# Patient Record
Sex: FEMALE | Race: WHITE | Employment: FULL TIME | URBAN - METROPOLITAN AREA
[De-identification: names, ages, dates, MRNs, and addresses within clinical notes are randomized per-mention and may not be internally consistent; named-entity substitution may affect disease eponyms.]

---

## 2017-01-09 ENCOUNTER — HOSPITAL ENCOUNTER (OUTPATIENT)
Dept: OTHER | Age: 52
Discharge: HOME | End: 2017-01-09
Attending: INTERNAL MEDICINE | Admitting: INTERNAL MEDICINE

## 2017-01-11 LAB — H PYLORI ANTIGEN STOOL: POSITIVE

## 2017-04-27 ENCOUNTER — OFFICE VISIT (OUTPATIENT)
Dept: ORTHOPEDIC SURGERY | Age: 52
End: 2017-04-27

## 2017-04-27 VITALS
DIASTOLIC BLOOD PRESSURE: 84 MMHG | BODY MASS INDEX: 35.36 KG/M2 | HEIGHT: 66 IN | HEART RATE: 71 BPM | SYSTOLIC BLOOD PRESSURE: 135 MMHG | WEIGHT: 220.02 LBS

## 2017-04-27 DIAGNOSIS — M79.672 FOOT PAIN, LEFT: Primary | ICD-10-CM

## 2017-04-27 DIAGNOSIS — S93.602A FOOT SPRAIN, LEFT, INITIAL ENCOUNTER: ICD-10-CM

## 2017-04-27 PROCEDURE — 99213 OFFICE O/P EST LOW 20 MIN: CPT | Performed by: PHYSICIAN ASSISTANT

## 2017-04-27 PROCEDURE — L3260 AMBULATORY SURGICAL BOOT EAC: HCPCS | Performed by: PHYSICIAN ASSISTANT

## 2017-04-27 PROCEDURE — 73630 X-RAY EXAM OF FOOT: CPT | Performed by: PHYSICIAN ASSISTANT

## 2017-05-17 ENCOUNTER — OFFICE VISIT (OUTPATIENT)
Dept: ORTHOPEDIC SURGERY | Age: 52
End: 2017-05-17

## 2017-05-17 VITALS — HEIGHT: 66 IN | BODY MASS INDEX: 35.36 KG/M2 | WEIGHT: 220.02 LBS

## 2017-05-17 DIAGNOSIS — M79.672 LEFT FOOT PAIN: ICD-10-CM

## 2017-05-17 DIAGNOSIS — S93.602A FOOT SPRAIN, LEFT, INITIAL ENCOUNTER: Primary | ICD-10-CM

## 2017-05-17 DIAGNOSIS — M65.9 SYNOVITIS OF LEFT FOOT: ICD-10-CM

## 2017-05-17 PROBLEM — S93.609A FOOT SPRAIN: Status: ACTIVE | Noted: 2017-05-17

## 2017-05-17 PROCEDURE — 99214 OFFICE O/P EST MOD 30 MIN: CPT | Performed by: FAMILY MEDICINE

## 2017-05-17 RX ORDER — NAPROXEN SODIUM 550 MG/1
550 TABLET ORAL 2 TIMES DAILY WITH MEALS
Qty: 60 TABLET | Refills: 3 | Status: SHIPPED | OUTPATIENT
Start: 2017-05-17 | End: 2018-02-07

## 2017-05-17 RX ORDER — DEXAMETHASONE SODIUM PHOSPHATE 4 MG/ML
INJECTION, SOLUTION INTRA-ARTICULAR; INTRALESIONAL; INTRAMUSCULAR; INTRAVENOUS; SOFT TISSUE
Qty: 30 ML | Refills: 0 | Status: SHIPPED | OUTPATIENT
Start: 2017-05-17 | End: 2017-07-05

## 2017-05-17 RX ORDER — METHYLPREDNISOLONE 4 MG/1
TABLET ORAL
Qty: 21 KIT | Refills: 0 | Status: SHIPPED | OUTPATIENT
Start: 2017-05-17 | End: 2017-07-05

## 2017-05-23 ENCOUNTER — HOSPITAL ENCOUNTER (OUTPATIENT)
Dept: PHYSICAL THERAPY | Age: 52
Discharge: HOME | End: 2017-05-31
Admitting: FAMILY MEDICINE

## 2017-05-25 ENCOUNTER — HOSPITAL ENCOUNTER (OUTPATIENT)
Dept: PHYSICAL THERAPY | Age: 52
Discharge: HOME | End: 2017-05-25
Admitting: FAMILY MEDICINE

## 2017-05-30 ENCOUNTER — TELEPHONE (OUTPATIENT)
Dept: ORTHOPEDIC SURGERY | Age: 52
End: 2017-05-30

## 2017-06-01 ENCOUNTER — HOSPITAL ENCOUNTER (OUTPATIENT)
Dept: PHYSICAL THERAPY | Age: 52
Discharge: HOME | End: 2017-06-01
Admitting: FAMILY MEDICINE

## 2017-06-06 ENCOUNTER — HOSPITAL ENCOUNTER (OUTPATIENT)
Dept: PHYSICAL THERAPY | Age: 52
Discharge: HOME | End: 2017-06-06
Admitting: FAMILY MEDICINE

## 2017-06-08 ENCOUNTER — HOSPITAL ENCOUNTER (OUTPATIENT)
Dept: PHYSICAL THERAPY | Age: 52
Discharge: HOME | End: 2017-06-08
Admitting: FAMILY MEDICINE

## 2017-06-12 ENCOUNTER — HOSPITAL ENCOUNTER (OUTPATIENT)
Dept: PHYSICAL THERAPY | Age: 52
Discharge: HOME | End: 2017-06-12
Admitting: FAMILY MEDICINE

## 2017-06-14 ENCOUNTER — OFFICE VISIT (OUTPATIENT)
Dept: ORTHOPEDIC SURGERY | Age: 52
End: 2017-06-14

## 2017-06-14 VITALS — BODY MASS INDEX: 35.36 KG/M2 | WEIGHT: 220.02 LBS | HEIGHT: 66 IN

## 2017-06-14 DIAGNOSIS — M79.672 LEFT FOOT PAIN: ICD-10-CM

## 2017-06-14 DIAGNOSIS — S93.602D FOOT SPRAIN, LEFT, SUBSEQUENT ENCOUNTER: Primary | ICD-10-CM

## 2017-06-14 DIAGNOSIS — M65.9 SYNOVITIS OF LEFT FOOT: ICD-10-CM

## 2017-06-14 PROCEDURE — 99213 OFFICE O/P EST LOW 20 MIN: CPT | Performed by: FAMILY MEDICINE

## 2017-06-14 RX ORDER — AMOXICILLIN AND CLAVULANATE POTASSIUM 875; 125 MG/1; MG/1
TABLET, FILM COATED ORAL
COMMUNITY
Start: 2017-06-14 | End: 2017-07-21 | Stop reason: ALTCHOICE

## 2017-06-15 ENCOUNTER — HOSPITAL ENCOUNTER (OUTPATIENT)
Dept: PHYSICAL THERAPY | Age: 52
Discharge: HOME | End: 2017-06-15
Admitting: FAMILY MEDICINE

## 2017-06-19 ENCOUNTER — HOSPITAL ENCOUNTER (OUTPATIENT)
Dept: PHYSICAL THERAPY | Age: 52
Discharge: HOME | End: 2017-06-19
Admitting: FAMILY MEDICINE

## 2017-06-22 ENCOUNTER — HOSPITAL ENCOUNTER (OUTPATIENT)
Dept: PHYSICAL THERAPY | Age: 52
Discharge: HOME | End: 2017-06-22
Admitting: FAMILY MEDICINE

## 2017-06-26 ENCOUNTER — HOSPITAL ENCOUNTER (OUTPATIENT)
Dept: PHYSICAL THERAPY | Age: 52
Discharge: HOME | End: 2017-06-26
Admitting: FAMILY MEDICINE

## 2017-06-30 ENCOUNTER — ORTHOTIC/BRACE ENCOUNTER (OUTPATIENT)
Dept: ORTHOPEDIC SURGERY | Age: 52
End: 2017-06-30

## 2017-07-05 ENCOUNTER — OFFICE VISIT (OUTPATIENT)
Dept: ORTHOPEDIC SURGERY | Age: 52
End: 2017-07-05

## 2017-07-05 VITALS — WEIGHT: 220.02 LBS | BODY MASS INDEX: 35.36 KG/M2 | HEIGHT: 66 IN

## 2017-07-05 DIAGNOSIS — M65.9 SYNOVITIS OF LEFT FOOT: Primary | ICD-10-CM

## 2017-07-05 DIAGNOSIS — S93.602D FOOT SPRAIN, LEFT, SUBSEQUENT ENCOUNTER: ICD-10-CM

## 2017-07-05 DIAGNOSIS — M79.671 RIGHT FOOT PAIN: ICD-10-CM

## 2017-07-05 PROCEDURE — 99214 OFFICE O/P EST MOD 30 MIN: CPT | Performed by: FAMILY MEDICINE

## 2017-07-21 ENCOUNTER — OFFICE VISIT (OUTPATIENT)
Dept: CARDIOLOGY CLINIC | Age: 52
End: 2017-07-21

## 2017-07-21 VITALS
HEIGHT: 66 IN | OXYGEN SATURATION: 97 % | BODY MASS INDEX: 37.93 KG/M2 | SYSTOLIC BLOOD PRESSURE: 100 MMHG | HEART RATE: 89 BPM | WEIGHT: 236 LBS | DIASTOLIC BLOOD PRESSURE: 70 MMHG

## 2017-07-21 DIAGNOSIS — R00.0 TACHYCARDIA: Primary | ICD-10-CM

## 2017-07-21 DIAGNOSIS — R06.02 SHORTNESS OF BREATH: ICD-10-CM

## 2017-07-21 DIAGNOSIS — R00.0 INAPPROPRIATE SINUS TACHYCARDIA: ICD-10-CM

## 2017-07-21 DIAGNOSIS — F41.1 ANXIETY STATE: ICD-10-CM

## 2017-07-21 PROBLEM — I47.11 INAPPROPRIATE SINUS TACHYCARDIA: Status: ACTIVE | Noted: 2017-07-21

## 2017-07-21 PROCEDURE — 99204 OFFICE O/P NEW MOD 45 MIN: CPT | Performed by: INTERNAL MEDICINE

## 2017-07-21 PROCEDURE — 93000 ELECTROCARDIOGRAM COMPLETE: CPT | Performed by: INTERNAL MEDICINE

## 2017-07-21 RX ORDER — TRIAMTERENE AND HYDROCHLOROTHIAZIDE 37.5; 25 MG/1; MG/1
TABLET ORAL
COMMUNITY
Start: 2017-03-06

## 2017-07-21 RX ORDER — ZOLPIDEM TARTRATE 10 MG/1
10 TABLET ORAL
COMMUNITY
Start: 2017-07-10 | End: 2018-02-07

## 2017-07-26 ENCOUNTER — OFFICE VISIT (OUTPATIENT)
Dept: ORTHOPEDIC SURGERY | Age: 52
End: 2017-07-26

## 2017-07-26 VITALS — BODY MASS INDEX: 37.91 KG/M2 | WEIGHT: 235.89 LBS | HEIGHT: 66 IN

## 2017-07-26 DIAGNOSIS — S93.602D FOOT SPRAIN, LEFT, SUBSEQUENT ENCOUNTER: ICD-10-CM

## 2017-07-26 DIAGNOSIS — M65.9 SYNOVITIS OF LEFT FOOT: Primary | ICD-10-CM

## 2017-07-26 DIAGNOSIS — M79.672 LEFT FOOT PAIN: ICD-10-CM

## 2017-07-26 PROCEDURE — 99213 OFFICE O/P EST LOW 20 MIN: CPT | Performed by: FAMILY MEDICINE

## 2017-07-27 ENCOUNTER — HOSPITAL ENCOUNTER (OUTPATIENT)
Dept: CARDIOLOGY | Facility: CLINIC | Age: 52
Discharge: HOME | End: 2017-07-27
Attending: INTERNAL MEDICINE | Admitting: INTERNAL MEDICINE

## 2017-07-27 ENCOUNTER — TELEPHONE (OUTPATIENT)
Dept: CARDIOLOGY CLINIC | Age: 52
End: 2017-07-27

## 2017-07-27 LAB
LV EF: 81 %
LVEF MODALITY: NORMAL

## 2017-08-02 ENCOUNTER — OFFICE VISIT (OUTPATIENT)
Dept: ORTHOPEDIC SURGERY | Age: 52
End: 2017-08-02

## 2017-08-02 VITALS — HEIGHT: 66 IN | WEIGHT: 235.89 LBS | BODY MASS INDEX: 37.91 KG/M2

## 2017-08-02 DIAGNOSIS — M79.672 LEFT FOOT PAIN: ICD-10-CM

## 2017-08-02 DIAGNOSIS — M21.41 PES PLANUS OF BOTH FEET: ICD-10-CM

## 2017-08-02 DIAGNOSIS — M65.9 SYNOVITIS OF LEFT FOOT: Primary | ICD-10-CM

## 2017-08-02 DIAGNOSIS — M21.42 PES PLANUS OF BOTH FEET: ICD-10-CM

## 2017-08-02 DIAGNOSIS — S93.602D FOOT SPRAIN, LEFT, SUBSEQUENT ENCOUNTER: ICD-10-CM

## 2017-08-02 PROCEDURE — 99213 OFFICE O/P EST LOW 20 MIN: CPT | Performed by: FAMILY MEDICINE

## 2017-08-02 PROCEDURE — 20600 DRAIN/INJ JOINT/BURSA W/O US: CPT | Performed by: FAMILY MEDICINE

## 2017-08-08 ENCOUNTER — TELEPHONE (OUTPATIENT)
Dept: ORTHOPEDIC SURGERY | Age: 52
End: 2017-08-08

## 2017-08-09 ENCOUNTER — OFFICE VISIT (OUTPATIENT)
Dept: ORTHOPEDIC SURGERY | Age: 52
End: 2017-08-09

## 2017-08-09 VITALS
HEIGHT: 66 IN | BODY MASS INDEX: 37.91 KG/M2 | SYSTOLIC BLOOD PRESSURE: 124 MMHG | WEIGHT: 235.89 LBS | DIASTOLIC BLOOD PRESSURE: 80 MMHG | HEART RATE: 88 BPM

## 2017-08-09 DIAGNOSIS — L03.031 PARONYCHIA OF GREAT TOE, RIGHT: ICD-10-CM

## 2017-08-09 DIAGNOSIS — L03.032 PARONYCHIA OF GREAT TOE, LEFT: Primary | ICD-10-CM

## 2017-08-09 PROCEDURE — 99202 OFFICE O/P NEW SF 15 MIN: CPT | Performed by: PODIATRIST

## 2017-08-09 PROCEDURE — 11750 EXCISION NAIL&NAIL MATRIX: CPT | Performed by: PODIATRIST

## 2017-08-09 RX ORDER — NADOLOL 20 MG/1
TABLET ORAL
COMMUNITY
Start: 2017-02-07 | End: 2017-12-13 | Stop reason: SDUPTHER

## 2017-08-09 RX ORDER — MEPERIDINE HYDROCHLORIDE 50 MG/1
50 TABLET ORAL
COMMUNITY
Start: 2017-05-10 | End: 2017-12-13 | Stop reason: SDUPTHER

## 2017-08-09 RX ORDER — MEPERIDINE HYDROCHLORIDE 50 MG/1
50 TABLET ORAL
COMMUNITY
End: 2017-12-13 | Stop reason: SDUPTHER

## 2017-08-09 RX ORDER — NADOLOL 20 MG/1
20 TABLET ORAL
COMMUNITY
End: 2017-12-13 | Stop reason: SDUPTHER

## 2017-08-30 ENCOUNTER — OFFICE VISIT (OUTPATIENT)
Dept: ORTHOPEDIC SURGERY | Age: 52
End: 2017-08-30

## 2017-08-30 VITALS
HEART RATE: 84 BPM | WEIGHT: 235.89 LBS | HEIGHT: 66 IN | DIASTOLIC BLOOD PRESSURE: 74 MMHG | SYSTOLIC BLOOD PRESSURE: 120 MMHG | BODY MASS INDEX: 37.91 KG/M2

## 2017-08-30 DIAGNOSIS — L03.032 PARONYCHIA OF GREAT TOE, LEFT: Primary | ICD-10-CM

## 2017-08-30 PROCEDURE — 99212 OFFICE O/P EST SF 10 MIN: CPT | Performed by: PODIATRIST

## 2017-08-30 RX ORDER — ZOLPIDEM TARTRATE 10 MG/1
10 TABLET ORAL
COMMUNITY
Start: 2017-08-14 | End: 2017-12-13 | Stop reason: SDUPTHER

## 2017-08-30 RX ORDER — ERYTHROMYCIN AND BENZOYL PEROXIDE 30; 50 MG/G; MG/G
GEL TOPICAL
COMMUNITY

## 2017-08-30 RX ORDER — TRIAMTERENE AND HYDROCHLOROTHIAZIDE 37.5; 25 MG/1; MG/1
TABLET ORAL
COMMUNITY
Start: 2017-03-06 | End: 2017-12-13 | Stop reason: SDUPTHER

## 2017-08-30 RX ORDER — NADOLOL 20 MG/1
20 TABLET ORAL
COMMUNITY
Start: 2017-08-14 | End: 2017-12-13 | Stop reason: SDUPTHER

## 2017-08-30 RX ORDER — CEPHALEXIN 500 MG/1
500 CAPSULE ORAL 3 TIMES DAILY
Qty: 21 CAPSULE | Refills: 0 | Status: SHIPPED | OUTPATIENT
Start: 2017-08-30 | End: 2018-01-10

## 2017-09-05 ENCOUNTER — OFFICE VISIT (OUTPATIENT)
Dept: ORTHOPEDIC SURGERY | Age: 52
End: 2017-09-05

## 2017-09-05 VITALS
SYSTOLIC BLOOD PRESSURE: 128 MMHG | HEIGHT: 66 IN | BODY MASS INDEX: 37.91 KG/M2 | HEART RATE: 70 BPM | WEIGHT: 235.89 LBS | DIASTOLIC BLOOD PRESSURE: 78 MMHG

## 2017-09-05 DIAGNOSIS — L03.032 PARONYCHIA OF GREAT TOE, LEFT: Primary | ICD-10-CM

## 2017-09-05 PROCEDURE — 99212 OFFICE O/P EST SF 10 MIN: CPT | Performed by: PODIATRIST

## 2017-09-08 RX ORDER — CEPHALEXIN 500 MG/1
500 CAPSULE ORAL 3 TIMES DAILY
Qty: 30 CAPSULE | Refills: 0 | Status: SHIPPED | OUTPATIENT
Start: 2017-09-08 | End: 2018-01-10

## 2017-09-13 ENCOUNTER — OFFICE VISIT (OUTPATIENT)
Dept: ORTHOPEDIC SURGERY | Age: 52
End: 2017-09-13

## 2017-09-13 VITALS
WEIGHT: 235.89 LBS | DIASTOLIC BLOOD PRESSURE: 72 MMHG | HEIGHT: 66 IN | BODY MASS INDEX: 37.91 KG/M2 | HEART RATE: 70 BPM | SYSTOLIC BLOOD PRESSURE: 117 MMHG

## 2017-09-13 DIAGNOSIS — L03.032 PARONYCHIA OF GREAT TOE, LEFT: Primary | ICD-10-CM

## 2017-09-13 PROCEDURE — 99212 OFFICE O/P EST SF 10 MIN: CPT | Performed by: PODIATRIST

## 2017-09-13 RX ORDER — CLINDAMYCIN HYDROCHLORIDE 300 MG/1
300 CAPSULE ORAL 3 TIMES DAILY
Qty: 30 CAPSULE | Refills: 0 | Status: SHIPPED | OUTPATIENT
Start: 2017-09-13 | End: 2018-02-07

## 2017-09-13 RX ORDER — ZOLPIDEM TARTRATE 10 MG/1
TABLET ORAL
COMMUNITY
Start: 2017-09-11 | End: 2017-12-13 | Stop reason: SDUPTHER

## 2017-09-13 RX ORDER — MUPIROCIN CALCIUM 20 MG/G
CREAM TOPICAL
COMMUNITY
Start: 2017-09-08 | End: 2018-02-07

## 2017-09-13 RX ORDER — AZITHROMYCIN 250 MG/1
TABLET, FILM COATED ORAL
COMMUNITY
Start: 2017-09-08 | End: 2018-02-07

## 2017-09-13 RX ORDER — AZITHROMYCIN 250 MG/1
250 TABLET, FILM COATED ORAL
COMMUNITY
Start: 2017-09-08 | End: 2017-09-13

## 2017-09-13 RX ORDER — CODEINE PHOSPHATE AND GUAIFENESIN 10; 100 MG/5ML; MG/5ML
LIQUID ORAL PRN
COMMUNITY
Start: 2017-09-08

## 2017-09-13 RX ORDER — MEPERIDINE HYDROCHLORIDE 50 MG/1
50 TABLET ORAL
COMMUNITY
Start: 2017-09-08 | End: 2017-12-13 | Stop reason: SDUPTHER

## 2017-09-19 ENCOUNTER — OFFICE VISIT (OUTPATIENT)
Dept: ORTHOPEDIC SURGERY | Age: 52
End: 2017-09-19

## 2017-09-19 VITALS
BODY MASS INDEX: 37.91 KG/M2 | HEART RATE: 77 BPM | DIASTOLIC BLOOD PRESSURE: 78 MMHG | WEIGHT: 235.89 LBS | SYSTOLIC BLOOD PRESSURE: 130 MMHG | HEIGHT: 66 IN

## 2017-09-19 DIAGNOSIS — L03.032 PARONYCHIA OF GREAT TOE, LEFT: ICD-10-CM

## 2017-09-19 DIAGNOSIS — M79.672 LEFT FOOT PAIN: Primary | ICD-10-CM

## 2017-09-19 PROBLEM — M47.819 ARTHRITIS OF SPINE: Status: ACTIVE | Noted: 2017-09-11

## 2017-09-19 PROCEDURE — 99213 OFFICE O/P EST LOW 20 MIN: CPT | Performed by: PODIATRIST

## 2017-09-27 ENCOUNTER — OFFICE VISIT (OUTPATIENT)
Dept: ORTHOPEDIC SURGERY | Age: 52
End: 2017-09-27

## 2017-09-27 VITALS
HEIGHT: 66 IN | SYSTOLIC BLOOD PRESSURE: 128 MMHG | DIASTOLIC BLOOD PRESSURE: 78 MMHG | WEIGHT: 235.89 LBS | BODY MASS INDEX: 37.91 KG/M2 | HEART RATE: 70 BPM

## 2017-09-27 DIAGNOSIS — L03.032 PARONYCHIA OF GREAT TOE, LEFT: Primary | ICD-10-CM

## 2017-09-27 PROCEDURE — 99212 OFFICE O/P EST SF 10 MIN: CPT | Performed by: PODIATRIST

## 2017-09-27 RX ORDER — RABEPRAZOLE SODIUM 20 MG/1
20 TABLET, DELAYED RELEASE ORAL
COMMUNITY
Start: 2017-09-13 | End: 2018-02-07

## 2017-09-27 RX ORDER — RABEPRAZOLE SODIUM 20 MG/1
TABLET, DELAYED RELEASE ORAL
COMMUNITY
Start: 2017-09-13 | End: 2018-02-07

## 2017-10-18 ENCOUNTER — OFFICE VISIT (OUTPATIENT)
Dept: ORTHOPEDIC SURGERY | Age: 52
End: 2017-10-18

## 2017-10-18 VITALS — WEIGHT: 235.89 LBS | HEIGHT: 66 IN | BODY MASS INDEX: 37.91 KG/M2

## 2017-10-18 DIAGNOSIS — M21.42 PES PLANUS OF BOTH FEET: ICD-10-CM

## 2017-10-18 DIAGNOSIS — M25.562 ACUTE PAIN OF LEFT KNEE: Primary | ICD-10-CM

## 2017-10-18 DIAGNOSIS — M76.31 ILIOTIBIAL BAND SYNDROME, RIGHT: ICD-10-CM

## 2017-10-18 DIAGNOSIS — M72.2 PLANTAR FASCIITIS OF LEFT FOOT: ICD-10-CM

## 2017-10-18 DIAGNOSIS — M79.604 RIGHT LEG PAIN: ICD-10-CM

## 2017-10-18 DIAGNOSIS — S76.311A RIGHT HAMSTRING MUSCLE STRAIN, INITIAL ENCOUNTER: ICD-10-CM

## 2017-10-18 DIAGNOSIS — M21.41 PES PLANUS OF BOTH FEET: ICD-10-CM

## 2017-10-18 PROCEDURE — 3017F COLORECTAL CA SCREEN DOC REV: CPT | Performed by: FAMILY MEDICINE

## 2017-10-18 PROCEDURE — 99214 OFFICE O/P EST MOD 30 MIN: CPT | Performed by: FAMILY MEDICINE

## 2017-10-18 PROCEDURE — 3014F SCREEN MAMMO DOC REV: CPT | Performed by: FAMILY MEDICINE

## 2017-10-18 PROCEDURE — 1036F TOBACCO NON-USER: CPT | Performed by: FAMILY MEDICINE

## 2017-10-18 PROCEDURE — G8484 FLU IMMUNIZE NO ADMIN: HCPCS | Performed by: FAMILY MEDICINE

## 2017-10-18 PROCEDURE — G8417 CALC BMI ABV UP PARAM F/U: HCPCS | Performed by: FAMILY MEDICINE

## 2017-10-18 PROCEDURE — G8428 CUR MEDS NOT DOCUMENT: HCPCS | Performed by: FAMILY MEDICINE

## 2017-10-18 RX ORDER — NAPROXEN 500 MG/1
500 TABLET ORAL 2 TIMES DAILY WITH MEALS
Qty: 60 TABLET | Refills: 3 | Status: SHIPPED | OUTPATIENT
Start: 2017-10-18 | End: 2018-02-08 | Stop reason: HOSPADM

## 2017-10-18 RX ORDER — TRAMADOL HYDROCHLORIDE 50 MG/1
TABLET ORAL
Qty: 40 TABLET | Refills: 0 | Status: SHIPPED | OUTPATIENT
Start: 2017-10-18 | End: 2018-01-10

## 2017-10-18 RX ORDER — PREDNISONE 20 MG/1
TABLET ORAL
Qty: 20 TABLET | Refills: 0 | Status: SHIPPED | OUTPATIENT
Start: 2017-10-18 | End: 2018-01-10

## 2017-10-19 NOTE — PROGRESS NOTES
Chief Complaint  Foot Pain (Left foot); Leg Pain (Right hamstring); and Knee Pain (Left knee)    Initial evaluation left heel and plantar fascial pain with right gluteal hamstring pain with lateral pain and difficulty walking    History of Present Illness:  Brian Ramirez is a 45 y.o. 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 Varsha Arce well known to us who has a history of fibromyalgia and chronic mechanical back pain and possible facet mediated pain as well as right trochanter bursitis who is being seen today for 2 separate orthopedic issues. She states that she had been recovering over the last several months from treatment of a paronychia to her left great toe with Dr. Marina Johnson was required to wear a postop shoe. Since late September 2017 she has noticed increasing pain to her posterior left heel with tightness to her plantar fascia and morning stiffness. There is no history of trauma. Having to wear her postop shoe and with gait alteration, she has noticed increasing pain to her right gluteal region as well as lateral aspect of her right hip. She has been having hamstring tightness and soreness and difficulty with prolonged standing and walking distances. Since roughly 10/5/2017 she has been seeing her therapist Vianey Rodríguez for dry needling and he was concerned about proximal hamstring tendinitis versus IT band. He did recommend this orthopedic consultation. She has continued with her Naprosyn twice daily and is trying to stretch. He'll become progressively worse prompting today's consultation. She does complain of an achy pain in her hamstring at 3-4/10 which are for 6-7 out of 10 pain coming home from work after prolonged standing and walking. She did have an MRI performed back in 2015 showing a small L4 5 and 3 mm retrolisthesis with possible abutment of her left S1 nerve. .  She is seen ulcerations or lesions. Distal motor sensory and vascular exam is intact. Gait: Reasonably fluid gait with overpronation. Reflex symmetrically preserved. Additional Comments: She does have tenderness to her left heel of the plantar fascial insertion over the medial calcaneal tubercle. She is tight with regard to her Achilles as well as her plantar fascia. There is no evidence of instability. Strength testing about the ankle is intact. She does have some tenderness mildly over the medial and lateral patellofemoral facet. She is only mildly positive grind testing and she does not exhibit high-grade joint line tenderness. There is no evidence of effusion. She does have reasonable motion with tightness to her hamstrings. No evidence of instability. Screening left hip testing is benign. Additional Examinations:  Contralateral Exam: Examination of the left hip reveals intact skin. The patient demonstrates full painless range of motion with regards to flexion, abduction, internal and external rotation. There is not tenderness about the greater trochanter. There is a negative straight leg raise against resistance. Strength is 5/5 thorough out all planes. Contralateral right heel exam is benign. Right Lower Extremity: Examination of the right lower extremity does not show any tenderness, deformity or injury. Range of motion is unremarkable. There is no gross instability. There are no rashes, ulcerations or lesions. Strength and tone are normal.  Left Lower Extremity: Examination of the left lower extremity does not show any tenderness, deformity or injury. Range of motion is unremarkable. There is no gross instability. There are no rashes, ulcerations or lesions. Strength and tone are normal.      Diagnostic Test Findings:  AP and lateral lumbar spine films does not show evidence of acute osseous injury. Underlying facet arthropathy and mild degenerative changes noted.   Left knee AP weightbearing sunrise and lateral films were obtained today and these show some mild underlying deeper degenerative changes with mild patellofemoral tilt. Assessment :  #1.  4 status post symptomatic right hamstring strain/tendinitis with right trochanteric bursitis and IT band   #2. Left plantar fasciitis with overpronation. #3. Low-grade aggravation left knee reactive patellofemoral compression syndrome. #4.  History of mechanical low back pain with lumbar degenerative disc disease and history of facet mediated pain and fibromyalgia    Impression:  Encounter Diagnoses   Name Primary?  Acute pain of left knee Yes    Right leg pain     Right hamstring muscle strain, initial encounter     Plantar fasciitis of left foot     Pes planus of both feet     Iliotibial band syndrome, right        Office Procedures:  Orders Placed This Encounter   Procedures    XR KNEE LEFT (MIN 4 VIEWS)     [id number]     Order Specific Question:   Reason for exam:     Answer:   Pain    XR LUMBAR SPINE (2-3 VIEWS)     [id number]     Order Specific Question:   Reason for exam:     Answer:   Xray Exam    Ambulatory referral to Physical Therapy     Referral Priority:   Routine     Referral Type:   Eval and Treat     Referral Reason:   Specialty Services Required     Requested Specialty:   Physical Therapy     Number of Visits Requested:   1       Treatment Plan:  Treatment options were discussed with Fred Nieves. We did review her plain films and exam findings. I do believe she does have right proximal hamstring tendinitis as well as right hip IT band with low-grade trochanteric bursitis. She also does have a history of chronic mechanical back pain and fibromyalgia. I do not sense high-grade right-sided radicular symptoms and we did review her previous MRI from 2015 of her lumbar spine. She also does have some left-sided plantar fasciitis and planus.   We did place her on the 13th a prednisone taper to be followed by Naprosyn 500 mg 1 pill twice daily. I think she may continue with her dry needling with Salas Chacon and her therapist.  She also discussed to have dedicated therapy for her left plantar fascia and right hamstring tendinitis and hip rehab. We will review her lumbar rehab as well. We will see her back in about 3-4 weeks for follow-up. May consider local left plantar fascial injection and for right hip trochanteric injection if she remains symptomatic. She says she can't work. She will continue with her orthotics. I will see her back in 3-4 weeks. We will inquire as to whether or not she ever followed up with rheumatology as previously discussed. This dictation was performed with a verbal recognition program (DRAGON) and it was checked for errors. It is possible that there are still dictated errors within this office note. If so, please bring any errors to my attention for an addendum. All efforts were made to ensure that this office note is accurate.

## 2017-10-23 ENCOUNTER — HOSPITAL ENCOUNTER (OUTPATIENT)
Dept: PHYSICAL THERAPY | Age: 52
Discharge: HOME | End: 2017-10-31
Admitting: FAMILY MEDICINE

## 2017-10-23 NOTE — FLOWSHEET NOTE
[facility],  [address] [place]  Phone: [phone]  Fax [phone]    Physical Therapy Daily Treatment Note  Date:  10/23/2017    Patient Name:  Kim Aguiar    :  1965  MRN: [id number]  Restrictions/Precautions:    Medical/Treatment Diagnosis Information:  Diagnosis: M25.562 (ICD-10-CM) - Acute pain of left knee, M79.604 (ICD-10-CM) - Right leg pain, M72.2 (ICD-10-CM) - Plantar fasciitis of left foot, M21.41, M21.42 (ICD-10-CM) - Pes planus of both feet,M76.31 (ICD-10-CM) - Iliotibial band syndrome, right      Insurance/Certification information:  PT Insurance Information: [facility]: 20 visits for the year, 10 used already  Physician Information:  Referring Practitioner: Dr Alex Mccarty of care signed (Y/N):     Date of Patient follow up with Physician: Dr Fuentes Martines prn    G-Code (if applicable):      Date G-Code Applied:    PT G-Codes  Functional Assessment Tool Used: LEFS  Score: 61%  Functional Limitation: Mobility: Walking and moving around  Mobility: Walking and Moving Around Current Status (): At least 60 percent but less than 80 percent impaired, limited or restricted  Mobility: Walking and Moving Around Goal Status ():  At least 20 percent but less than 40 percent impaired, limited or restricted    Progress Note: [x]  Yes  []  No  Next due by: Visit #10       Latex Allergy:  [x]NO      []YES  Preferred Language for Healthcare:   [x]English       []other:    Visit # Insurance Allowable   1 20 (10 used previously)     Pain level:  7-8/10 (left heel and right hip/buttock)     SUBJECTIVE:  See eval    OBJECTIVE: See eval  Observation:   Test measurements:      RESTRICTIONS/PRECAUTIONS: none noted    Exercises/Interventions:     Therapeutic Ex Sets/reps Notes   Supine knee to opp shoulder stretch 3x30 sec bilat hep   longsitting ham stretch 3x30 sec bilat hep   3 d gastroc stretches on incline 30 sec each way bilat hep   hooklying hip abd with band Blue 3 sec 10x hep   Supine bridge with ab brace 3 sec 10x hep                                                Manual Intervention                                   NMR re-education                                       Therapeutic Exercise and NMR EXR  [x] (75232) Provided verbal/tactile cueing for activities related to strengthening, flexibility, endurance, ROM for improvements in LE, proximal hip, and core control with self care, mobility, lifting, ambulation.  [] (81752) Provided verbal/tactile cueing for activities related to improving balance, coordination, kinesthetic sense, posture, motor skill, proprioception  to assist with LE, proximal hip, and core control in self care, mobility, lifting, ambulation and eccentric single leg control.      NMR and Therapeutic Activities:    [] (69458 or 43004) Provided verbal/tactile cueing for activities related to improving balance, coordination, kinesthetic sense, posture, motor skill, proprioception and motor activation to allow for proper function of core, proximal hip and LE with self care and ADLs  [] (62779) Gait Re-education- Provided training and instruction to the patient for proper LE, core and proximal hip recruitment and positioning and eccentric body weight control with ambulation re-education including up and down stairs     Home Exercise Program:    [x] (63864) Reviewed/Progressed HEP activities related to strengthening, flexibility, endurance, ROM of core, proximal hip and LE for functional self-care, mobility, lifting and ambulation/stair navigation   [] (44648)Reviewed/Progressed HEP activities related to improving balance, coordination, kinesthetic sense, posture, motor skill, proprioception of core, proximal hip and LE for self care, mobility, lifting, and ambulation/stair navigation      Manual Treatments:  PROM / STM / Oscillations-Mobs:  G-I, II, III, IV (PA's, Inf., Post.)  [] (56542) Provided manual

## 2017-10-23 NOTE — PLAN OF CARE
n/a    Gait: (include devices/WB status) slight trendelenburg gait    Orthopedic Special Tests: negative hip scour, pain at left ITB at knee with Kendell Trudy testing                       [x] Patient history, allergies, meds reviewed. Medical chart reviewed. See intake form. Review Of Systems (ROS):  [x]Performed Review of systems (Integumentary, CardioPulmonary, Neurological) by intake and observation. Intake form has been scanned into medical record. Patient has been instructed to contact their primary care physician regarding ROS issues if not already being addressed at this time. Co-morbidities/Complexities (which will affect course of rehabilitation):   []None           Arthritic conditions   []Rheumatoid arthritis (M05.9)  []Osteoarthritis (M19.91)   Cardiovascular conditions   []Hypertension (I10)  []Hyperlipidemia (E78.5)  []Angina pectoris (I20)  []Atherosclerosis (I70)   Musculoskeletal conditions   []Disc pathology   []Congenital spine pathologies   []Prior surgical intervention  []Osteoporosis (M81.8)  []Osteopenia (M85.8)   Endocrine conditions   []Hypothyroid (E03.9)  []Hyperthyroid Gastrointestinal conditions   []Constipation (L55.92)   Metabolic conditions   []Morbid obesity (E66.01)  []Diabetes type 1(E10.65) or 2 (E11.65)   []Neuropathy (G60.9)     Pulmonary conditions   []Asthma (J45)  []Coughing   []COPD (J44.9)   Psychological Disorders  []Anxiety (F41.9)  [x]Depression (F32.9)   []Other:   []Other:          Barriers to/and or personal factors that will affect rehab potential:              []Age  []Sex              []Motivation/Lack of Motivation                        [x]Co-Morbidities              []Cognitive Function, education/learning barriers              []Environmental, home barriers              []profession/work barriers  []past PT/medical experience  []other:  Justification: patient has multiple orthopedic complaints in bilateral lower extremities which affects her gait tolerance. These multiple issue could take more time than one issue to resolve    Falls Risk Assessment (30 days):  [x] Falls Risk assessed and no intervention required. [] Falls Risk assessed and Patient requires intervention due to being higher risk   TUG score (>12s at risk):     [] Falls education provided, including       G-Codes:  PT G-Codes  Functional Assessment Tool Used: LEFS  Score: 61%  Functional Limitation: Mobility: Walking and moving around  Mobility: Walking and Moving Around Current Status (): At least 60 percent but less than 80 percent impaired, limited or restricted  Mobility: Walking and Moving Around Goal Status ():  At least 20 percent but less than 40 percent impaired, limited or restricted    ASSESSMENT:   Functional Impairments:     []Noted lumbar/proximal hip/LE joint hypomobility   []Decreased LE functional ROM   [x]Decreased core/proximal hip strength and neuromuscular control   [x]Decreased LE functional strength   []Reduced balance/proprioceptive control   []other:      Functional Activity Limitations (from functional questionnaire and intake)   [x]Reduced ability to tolerate prolonged functional positions   []Reduced ability or difficulty with changes of positions or transfers between positions   []Reduced ability to maintain good posture and demonstrate good body mechanics with sitting, bending, and lifting   []Reduced ability to sleep   [] Reduced ability or tolerance with driving and/or computer work   [x]Reduced ability to perform lifting, carrying tasks   []Reduced ability to squat   []Reduced ability to forward bend   [x]Reduced ability to ambulate prolonged functional periods/distances/surfaces   [x]Reduced ability to ascend/descend stairs   []Reduced ability to run, hop, cut or jump   []other:    Participation Restrictions   []Reduced participation in self care activities   [x]Reduced participation in home management activities   [x]Reduced participation in work activities   []Reduced participation in social activities. []Reduced participation in sport/recreation activities. Classification :    []Signs/symptoms consistent with post-surgical status including decreased ROM, strength and function. []Signs/symptoms consistent with joint sprain/strain   []Signs/symptoms consistent with patella-femoral syndrome   []Signs/symptoms consistent with knee OA/hip OA   []Signs/symptoms consistent with internal derangement of knee/Hip   [x]Signs/symptoms consistent with functional hip weakness/NMR control      [x]Signs/symptoms consistent with tendinitis/tendinosis    []signs/symptoms consistent with pathology which may benefit from Dry needling      []other:      Prognosis/Rehab Potential:      []Excellent   [x]Good    []Fair   []Poor    Tolerance of evaluation/treatment:    []Excellent   [x]Good    []Fair   []Poor  Physical Therapy Evaluation Complexity Justification  [x] A history of present problem with:  [] no personal factors and/or comorbidities that impact the plan of care;  [x]1-2 personal factors and/or comorbidities that impact the plan of care  []3 personal factors and/or comorbidities that impact the plan of care  [x] An examination of body systems using standardized tests and measures addressing any of the following: body structures and functions (impairments), activity limitations, and/or participation restrictions;:  [] a total of 1-2 or more elements   [x] a total of 3 or more elements   [] a total of 4 or more elements   [x] A clinical presentation with:  [x] stable and/or uncomplicated characteristics   [] evolving clinical presentation with changing characteristics  [] unstable and unpredictable characteristics;   [x] Clinical decision making of [x] low, [] moderate, [] high complexity using standardized patient assessment instrument and/or measurable assessment of functional outcome.     [x] EVAL (LOW) 35987 (typically 20 minutes face-to-face)  [] EVAL (MOD)

## 2017-10-31 ENCOUNTER — HOSPITAL ENCOUNTER (OUTPATIENT)
Dept: PHYSICAL THERAPY | Age: 52
Discharge: HOME | End: 2017-10-31
Admitting: FAMILY MEDICINE

## 2017-10-31 DIAGNOSIS — S76.311A RIGHT HAMSTRING MUSCLE STRAIN, INITIAL ENCOUNTER: Primary | ICD-10-CM

## 2017-10-31 PROCEDURE — MISC901 COLD PAC STANDARD: Performed by: FAMILY MEDICINE

## 2017-11-01 ENCOUNTER — HOSPITAL ENCOUNTER (OUTPATIENT)
Dept: PHYSICAL THERAPY | Age: 52
Discharge: HOME | End: 2017-11-30
Attending: FAMILY MEDICINE | Admitting: FAMILY MEDICINE

## 2017-11-02 ENCOUNTER — HOSPITAL ENCOUNTER (OUTPATIENT)
Dept: PHYSICAL THERAPY | Age: 52
Discharge: HOME | End: 2017-11-02
Admitting: FAMILY MEDICINE

## 2017-11-02 NOTE — FLOWSHEET NOTE
band Green versaloop 3 sec 3x10 hep   hep      3 D hamstring stretch on steps 10x each way bilat         Leg press  80# 3x10                             Manual Intervention        STM proximal ham, ITB, gluteals 8 min Tender to prox ham, gluteals and piriformis   Passive stretches to bilat ham/ right piriformis and ITB 8 min                   NMR re-education                                       Therapeutic Exercise and NMR EXR  [x] (58855) Provided verbal/tactile cueing for activities related to strengthening, flexibility, endurance, ROM for improvements in LE, proximal hip, and core control with self care, mobility, lifting, ambulation.  [] (07787) Provided verbal/tactile cueing for activities related to improving balance, coordination, kinesthetic sense, posture, motor skill, proprioception  to assist with LE, proximal hip, and core control in self care, mobility, lifting, ambulation and eccentric single leg control.      NMR and Therapeutic Activities:    [] (46488 or 93145) Provided verbal/tactile cueing for activities related to improving balance, coordination, kinesthetic sense, posture, motor skill, proprioception and motor activation to allow for proper function of core, proximal hip and LE with self care and ADLs  [] (24337) Gait Re-education- Provided training and instruction to the patient for proper LE, core and proximal hip recruitment and positioning and eccentric body weight control with ambulation re-education including up and down stairs     Home Exercise Program:    [x] (96773) Reviewed/Progressed HEP activities related to strengthening, flexibility, endurance, ROM of core, proximal hip and LE for functional self-care, mobility, lifting and ambulation/stair navigation   [] (72139)Reviewed/Progressed HEP activities related to improving balance, coordination, kinesthetic sense, posture, motor skill, proprioception of core, proximal hip and LE for self care, mobility, lifting, and ambulation/stair navigation      Manual Treatments:  PROM / STM / Oscillations-Mobs:  G-I, II, III, IV (PA's, Inf., Post.)  [x] (52725) Provided manual therapy to mobilize LE, proximal hip and/or LS spine soft tissue/joints for the purpose of modulating pain, promoting relaxation,  increasing ROM, reducing/eliminating soft tissue swelling/inflammation/restriction, improving soft tissue extensibility and allowing for proper ROM for normal function with self care, mobility, lifting and ambulation. Modalities:  CP/stim to right buttock area prone lying x 15 min    Charges:  Timed Code Treatment Minutes: 38   Total Treatment Minutes: 53     [] EVAL (LOW) 03794 (typically 20 minutes face-to-face)  [] EVAL (MOD) 83416 (typically 30 minutes face-to-face)  [] EVAL (HIGH) 81516 (typically 45 minutes face-to-face)  [] RE-EVAL     [x] TF(51712) x  2   [] IONTO  [] NMR (00599) x      [] VASO  [x] Manual (54915) x  1    [] Other:  [] TA x       [] Mech Traction (55320)  [] ES(attended) (86623)      [x] ES (un) (59331):     GOALS: Patient stated goal: \"get out of pain\"    Therapist goals for Patient:   Short Term Goals: To be achieved in: 2 weeks  1. Independent in HEP and progression per patient tolerance, in order to prevent re-injury. 2. Patient will have a decrease in pain to facilitate improvement in movement, function, and ADLs as indicated by Functional Deficits. Long Term Goals: To be achieved in: 6 weeks  1. Disability index score of 28% or less for the LEFS to assist with reaching prior level of function. 2. Patient will demonstrate an increase in Strength to good proximal hip strength and control, within 5lb HHD in LE to allow for proper functional mobility as indicated by patients Functional Deficits. 3. Patient will return to adl functional activities without increased symptoms or restriction.    4. Decrease left foot and right hip/buttock pain from 7-8/10 to 3-4/10       Progression Towards Functional goals:  [] Patient

## 2017-11-07 ENCOUNTER — HOSPITAL ENCOUNTER (OUTPATIENT)
Dept: PHYSICAL THERAPY | Age: 52
Discharge: HOME | End: 2017-11-07
Admitting: FAMILY MEDICINE

## 2017-11-07 NOTE — FLOWSHEET NOTE
Lat. pulldown                                                  Chops                                                  Trunk Rot.         Modality CP/ES 15'   Initials DB
hip and LE for self care, mobility, lifting, and ambulation/stair navigation      Manual Treatments:  PROM / STM / Oscillations-Mobs:  G-I, II, III, IV (PA's, Inf., Post.)  [x] (30826) Provided manual therapy to mobilize LE, proximal hip and/or LS spine soft tissue/joints for the purpose of modulating pain, promoting relaxation,  increasing ROM, reducing/eliminating soft tissue swelling/inflammation/restriction, improving soft tissue extensibility and allowing for proper ROM for normal function with self care, mobility, lifting and ambulation. Modalities:  CP/stim to right buttock area prone lying x 15 min    Charges:  Timed Code Treatment Minutes: 45   Total Treatment Minutes: 60     [] EVAL (LOW) 57192 (typically 20 minutes face-to-face)  [] EVAL (MOD) 24898 (typically 30 minutes face-to-face)  [] EVAL (HIGH) 41212 (typically 45 minutes face-to-face)  [] RE-EVAL     [x] YD(35856) x  2   [] IONTO  [] NMR (08684) x      [] VASO  [x] Manual (23766) x  1    [] Other:  [] TA x       [] Mech Traction (15476)  [] ES(attended) (16199)      [x] ES (un) (10594):     GOALS: Patient stated goal: \"get out of pain\"    Therapist goals for Patient:   Short Term Goals: To be achieved in: 2 weeks  1. Independent in HEP and progression per patient tolerance, in order to prevent re-injury. 2. Patient will have a decrease in pain to facilitate improvement in movement, function, and ADLs as indicated by Functional Deficits. Long Term Goals: To be achieved in: 6 weeks  1. Disability index score of 28% or less for the LEFS to assist with reaching prior level of function. 2. Patient will demonstrate an increase in Strength to good proximal hip strength and control, within 5lb HHD in LE to allow for proper functional mobility as indicated by patients Functional Deficits. 3. Patient will return to adl functional activities without increased symptoms or restriction.    4. Decrease left foot and right hip/buttock pain from 7-8/10

## 2017-11-09 ENCOUNTER — HOSPITAL ENCOUNTER (OUTPATIENT)
Dept: PHYSICAL THERAPY | Age: 52
Discharge: HOME | End: 2017-11-09
Admitting: FAMILY MEDICINE

## 2017-11-09 NOTE — FLOWSHEET NOTE
[facility], [address]  Phone: [phone]  Fax [phone]    Physical Therapy Daily Treatment Note  Date:  2017    Patient Name:  Curt Ingram    :  1965  MRN: [id number]  Restrictions/Precautions:    Medical/Treatment Diagnosis Information:  · Diagnosis: M25.562 (ICD-10-CM) - Acute pain of left knee, M79.604 (ICD-10-CM) - Right leg pain, M72.2 (ICD-10-CM) - Plantar fasciitis of left foot, M21.41, M21.42 (ICD-10-CM) - Pes planus of both feet,M76.31 (ICD-10-CM) - Iliotibial band syndrome, right   ·  M25.562 (ICD-10-CM) - Acute pain of left knee, M79.604 (ICD-10-CM) - Right leg pain, M72.2 (ICD-10-CM) - Plantar fasciitis of left foot, M21.41, M21.42 (ICD-10-CM) - Pes planus of both feet,M76.31 (ICD-10-CM) - Iliotibial band syndrome, right      Insurance/Certification information:  PT Insurance Information: [place]: 20 visits for the year, 10 used already  Physician Information:  Dr. Mony Calvo of care signed (Y/N):     Date of Patient follow up with Physician: Dr Vielka francois    G-Code (if applicable):      Date G-Code Applied:         Progress Note: [x]  Yes  []  No  Next due by: Visit #10       Latex Allergy:  [x]NO      []YES  Preferred Language for Healthcare:   [x]English       []other:    Visit # Insurance Allowable   5/10 20 (10 used previously)     Pain level:  6/10 (left heel) and 8/10 (right hip/buttock)     SUBJECTIVE:  Patient reports her right hip and leg were \"talking to her\" after last session. She has a massage scheduled for tonight.   OBJECTIVE: See eval  Observation:   Test measurements:      RESTRICTIONS/PRECAUTIONS: none noted    Exercises/Interventions: see AT sheet    Therapeutic Ex Sets/reps Notes   Supine knee to opp shoulder stretch 3x30 sec bilat hep   hep   3 d gastroc stretches on incline 30 sec each way bilat hep   hep      Supine bridge with ab brace 3 sec 10x hep Oscillations-Mobs:  G-I, II, III, IV (PA's, Inf., Post.)  [x] (63347) Provided manual therapy to mobilize LE, proximal hip and/or LS spine soft tissue/joints for the purpose of modulating pain, promoting relaxation,  increasing ROM, reducing/eliminating soft tissue swelling/inflammation/restriction, improving soft tissue extensibility and allowing for proper ROM for normal function with self care, mobility, lifting and ambulation. Modalities:  CP/stim to right buttock area prone lying x 15 min    Charges:  Timed Code Treatment Minutes: 45   Total Treatment Minutes: 60     [] EVAL (LOW) 79176 (typically 20 minutes face-to-face)  [] EVAL (MOD) 88150 (typically 30 minutes face-to-face)  [] EVAL (HIGH) 47763 (typically 45 minutes face-to-face)  [] RE-EVAL     [x] QU(96378) x  2   [] IONTO  [] NMR (54741) x      [] VASO  [x] Manual (94201) x  1    [] Other:  [] TA x       [] Mech Traction (97511)  [] ES(attended) (95874)      [x] ES (un) (12311):     GOALS: Patient stated goal: \"get out of pain\"    Therapist goals for Patient:   Short Term Goals: To be achieved in: 2 weeks  1. Independent in HEP and progression per patient tolerance, in order to prevent re-injury. 2. Patient will have a decrease in pain to facilitate improvement in movement, function, and ADLs as indicated by Functional Deficits. Long Term Goals: To be achieved in: 6 weeks  1. Disability index score of 28% or less for the LEFS to assist with reaching prior level of function. 2. Patient will demonstrate an increase in Strength to good proximal hip strength and control, within 5lb HHD in LE to allow for proper functional mobility as indicated by patients Functional Deficits. 3. Patient will return to adl functional activities without increased symptoms or restriction.    4. Decrease left foot and right hip/buttock pain from 7-8/10 to 3-4/10       Progression Towards Functional goals:  [] Patient is progressing as expected towards functional goals listed. [] Progression is slowed due to complexities listed. [] Progression has been slowed due to co-morbidities. [x] Plan just implemented, too soon to assess goals progression  [] Other:     ASSESSMENT:  See eval    Treatment/Activity Tolerance:  [x] Patient tolerated treatment well [] Patient limited by fatique  [] Patient limited by pain  [] Patient limited by other medical complications  [] Other:     Prognosis: [] Good [] Fair  [] Poor    Patient Requires Follow-up: [x] Yes  [] No    PLAN: Cont PT 1x/week.   [x] Continue per plan of care [] Alter current plan (see comments)  [] Plan of care initiated [] Hold pending MD visit [] Discharge    Electronically signed by: William Deleon PT

## 2017-11-20 ENCOUNTER — HOSPITAL ENCOUNTER (OUTPATIENT)
Dept: PHYSICAL THERAPY | Age: 52
Discharge: HOME | End: 2017-11-20
Admitting: FAMILY MEDICINE

## 2017-11-20 NOTE — FLOWSHEET NOTE
[facility], [address] [place]  Phone: [phone]  Fax [phone]    ATHLETIC TRAINING [address]  Date:  2017    Patient Name:  Cesar Erickson    :  1965  MRN: [id number]  Restrictions/Precautions:    Medical/Treatment Diagnosis Information:  ·   Diagnosis: M25.562 (ICD-10-CM) - Acute pain of left knee, M79.604 (ICD-10-CM) - Right leg pain, M72.2 (ICD-10-CM) - Plantar fasciitis of left foot, M21.41, M21.42 (ICD-10-CM) - Pes planus of both feet,M76.31 (ICD-10-CM) - Iliotibial band syndrome, right   ·   M25.562 (ICD-10-CM) - Acute pain of left knee, M79.604 (ICD-10-CM) - Right leg pain, M72.2 (ICD-10-CM) - Plantar fasciitis of left foot, M21.41, M21.42 (ICD-10-CM) - Pes planus of both feet,M76.31 (ICD-10-CM) - Iliotibial band syndrome, right   Physician Information:    Dr. Madhav Andujar                                 Activity Log                                                               DOS/DOI:                                                             Date: 17   ATC Communication: furniture store sales, standing all day R radic pain to R glutes post standing  HEP: core/feet activation awarenes Significant R hip weakness, R hip Trendelenburg. Pt hesitant w/how much to \"push\" exercises. Discuss aquatic classes NV. CORE activation on ALL exercises. Back felt very tight after seeing LMT the last 2 times. Stressed importance of gentle HS stretching. Revisit topic of aquatic classes NV. Bike      Airdyne      Elliptical      Treadmill            Hamstring stretch 3D x10 R/L  knee ext/flex 10x ea   Quad stretch      Hip flexor stretch      Piriformis stretch      Gastroc stretch      Soleus stretch            Leg press  Bilat. Unilat.         Gliders R/L lateral 2x5  Gliders R/L post 5x ea, on straight leg (no squat) Gliders R/L lateral 2x5  Gliders R/L post 2x5 ea, on straight leg (no squat)   Knee ext. Standing step L x10 / R 2x3      Standing core/feet act x3'     Knee flex. Squats   Mini NV                    Wall                     [facility]            Step   Up NV Capt thrasher iso rxx ball into wall (hip abd) 2x5 3\" H rxx ball into wall (hip abd) 2x5 5\" H              Up and Over                 Lat. Down                       [id number]  Flex. stand on Airex xnx, march R/L 10x stand on Airex xnx, march R/L 10x             ABd                ADd                Ext            Cable Column/Theraband    Rows                                                    Ext. Lat. pulldown                                                    Chops                                                    Trunk Rot.             Reformer FW Walking   2R 10x   Reformer FW Heel Dips   2R 10x   Reformer FW Parallel Toes   2R 10x w/ring abd               Modality CP/ES 15' ES/CP 15' ES/CP 15'   Initials DB JLW JLW

## 2017-11-20 NOTE — FLOWSHEET NOTE
[facility]  [address] [place] [address]  Phone: [phone]  Fax [phone]    Physical Therapy Daily Treatment Note  Date:  2017    Patient Name:  Joanne Aldridge    :  1965  MRN: [id number]  Restrictions/Precautions:    Medical/Treatment Diagnosis Information:  · Diagnosis: M25.562 (ICD-10-CM) - Acute pain of left knee, M79.604 (ICD-10-CM) - Right leg pain, M72.2 (ICD-10-CM) - Plantar fasciitis of left foot, M21.41, M21.42 (ICD-10-CM) - Pes planus of both feet,M76.31 (ICD-10-CM) - Iliotibial band syndrome, right   ·  M25.562 (ICD-10-CM) - Acute pain of left knee, M79.604 (ICD-10-CM) - Right leg pain, M72.2 (ICD-10-CM) - Plantar fasciitis of left foot, M21.41, M21.42 (ICD-10-CM) - Pes planus of both feet,M76.31 (ICD-10-CM) - Iliotibial band syndrome, right      Insurance/Certification information:  PT Insurance Information: [facility]: 20 visits for the year, 10 used already  Phys[facility]  [facility] ([facility]/N[facility]     Date of Patient follow up with Physician: Dr Ardie Spurling prn    G-Code (if applicable):      Date G-Code Applied:         Progress Note: [x]  Yes  []  No  Next due by: Visit #10       Latex Allergy:  [x]NO      []YES  Preferred Language for Healthcare:   [x]English       []other:    Visit # Insurance Allowable   6/10 20 (10 used previously)     Pain level:  6/10 (left heel) and 8/10 (right hip/buttock)     SUBJECTIVE:  Patient reports she saw a different MD to see her for her toe. He wants her to try a boot for 2 weeks to let the big toe tendon calm down. She reports she went to massage therapist last week and has been having more in her back when she went to get back off the table. She had hamstring and low back pain at work. At home she feels like her calf stretches and buttock stretches are improving. Does not always get around to her clamshells.     OBJECTIVE: : we discussed need to stay faithful with exercises, including strengthening, or she would continue to have issues  Observation:   Test measurements:      RESTRICTIONS/PRECAUTIONS: none noted    Exercises/Interventions: see AT sheet    Therapeutic Ex Sets/reps Notes   Supine knee to opp shoulder stretch 3x30 sec bilat hep   hep   3 d gastroc stretches on incline 30 sec each way bilat hep   hooklying hip abd with band with ab brace Green versaloop 3 sec 3x10 Hep- 11/20- blue band for home      Supine bridge with ab brace 3 sec 10x hep                                          Manual Intervention     Prone low back general mobilizations Grade 2-3 x 5 min    STM proximal ham, ITB, gluteals 3 min Tender to prox ham, gluteals and piriformis   Passive stretches to bilat ham/ right piriformis and ITB/ lower back rotational stretch 8 min                   NMR re-education                                       Therapeutic Exercise and NMR EXR  [x] (17090) Provided verbal/tactile cueing for activities related to strengthening, flexibility, endurance, ROM for improvements in LE, proximal hip, and core control with self care, mobility, lifting, ambulation.  [] (34300) Provided verbal/tactile cueing for activities related to improving balance, coordination, kinesthetic sense, posture, motor skill, proprioception  to assist with LE, proximal hip, and core control in self care, mobility, lifting, ambulation and eccentric single leg control.      NMR and Therapeutic Activities:    [] (86791 or 41152) Provided verbal/tactile cueing for activities related to improving balance, coordination, kinesthetic sense, posture, motor skill, proprioception and motor activation to allow for proper function of core, proximal hip and LE with self care and ADLs  [] (81366) Gait Re-education- Provided training and instruction to the patient for proper LE, core and proximal hip recruitment and positioning and eccentric body weight control with ambulation score of 28% or less for the LEFS to assist with reaching prior level of function. 2. Patient will demonstrate an increase in Strength to good proximal hip strength and control, within 5lb HHD in LE to allow for proper functional mobility as indicated by patients Functional Deficits. 3. Patient will return to adl functional activities without increased symptoms or restriction. 4. Decrease left foot and right hip/buttock pain from 7-8/10 to 3-4/10       Progression Towards Functional goals:  [] Patient is progressing as expected towards functional goals listed. [] Progression is slowed due to complexities listed. [] Progression has been slowed due to co-morbidities. [x] Plan just implemented, too soon to assess goals progression  [] Other:     ASSESSMENT:  See eval    Treatment/Activity Tolerance:  [x] Patient tolerated treatment well [] Patient limited by fatique  [] Patient limited by pain  [] Patient limited by other medical complications  [] Other:     Prognosis: [] Good [] Fair  [] Poor    Patient Requires Follow-up: [x] Yes  [] No    PLAN: Cont PT 1x/week.   [x] Continue per plan of care [] Alter current plan (see comments)  [] Plan of care initiated [] Hold pending MD visit [] Discharge    Electronically signed by: Sherita Moreira, PT

## 2017-11-27 ENCOUNTER — HOSPITAL ENCOUNTER (OUTPATIENT)
Dept: PHYSICAL THERAPY | Age: 52
Discharge: HOME | End: 2017-11-27
Admitting: FAMILY MEDICINE

## 2017-11-27 NOTE — FLOWSHEET NOTE
the [facility] group fitness schedule  Observation:   Test measurements:      RESTRICTIONS/PRECAUTIONS: none noted    Exercises/Interventions: see AT sheet    Therapeutic Ex Sets/reps Notes   Supine knee to opp shoulder stretch 3x30 sec bilat hep   hep   3 d gastroc stretches on incline 30 sec each way bilat hep   hooklying hip abd with band with ab brace Green versaloop 3 sec 3x10 Hep- 11/20- blue band for home      Supine bridge with ab brace 3 sec 2x10 hep   SAQ with add squeeze 3# 3 sec 2x10 bilat                                           Manual Intervention     Prone low back general mobilizations Grade 2-3 x 5 min    STM proximal ham, ITB, gluteals 3 min Tender to prox ham, gluteals and piriformis   Passive stretches to bilat ham/ right piriformis and ITB/ lower back rotational stretch 8 min                   NMR re-education                                       Therapeutic Exercise and NMR EXR  [x] (65165) Provided verbal/tactile cueing for activities related to strengthening, flexibility, endurance, ROM for improvements in LE, proximal hip, and core control with self care, mobility, lifting, ambulation.  [] (15132) Provided verbal/tactile cueing for activities related to improving balance, coordination, kinesthetic sense, posture, motor skill, proprioception  to assist with LE, proximal hip, and core control in self care, mobility, lifting, ambulation and eccentric single leg control.      NMR and Therapeutic Activities:    [] (65528 or 83960) Provided verbal/tactile cueing for activities related to improving balance, coordination, kinesthetic sense, posture, motor skill, proprioception and motor activation to allow for proper function of core, proximal hip and LE with self care and ADLs  [] (95332) Gait Re-education- Provided training and instruction to the patient for proper LE, core and proximal hip recruitment and positioning and eccentric body weight control with ambulation re-education including up and down stairs     Home Exercise Program:    [x] (95997) Reviewed/Progressed HEP activities related to strengthening, flexibility, endurance, ROM of core, proximal hip and LE for functional self-care, mobility, lifting and ambulation/stair navigation   [] (87940)Reviewed/Progressed HEP activities related to improving balance, coordination, kinesthetic sense, posture, motor skill, proprioception of core, proximal hip and LE for self care, mobility, lifting, and ambulation/stair navigation      Manual Treatments:  PROM / STM / Oscillations-Mobs:  G-I, II, III, IV (PA's, Inf., Post.)  [x] (71753) Provided manual therapy to mobilize LE, proximal hip and/or LS spine soft tissue/joints for the purpose of modulating pain, promoting relaxation,  increasing ROM, reducing/eliminating soft tissue swelling/inflammation/restriction, improving soft tissue extensibility and allowing for proper ROM for normal function with self care, mobility, lifting and ambulation. Modalities:  CP/stim to right buttock area prone lying x 15 min    Charges:  Timed Code Treatment Minutes: 38   Total Treatment Minutes: 65     [] EVAL (LOW) 41716 (typically 20 minutes face-to-face)  [] EVAL (MOD) 54822 (typically 30 minutes face-to-face)  [] EVAL (HIGH) 13375 (typically 45 minutes face-to-face)  [] RE-EVAL     [x] PF(03138) x  2   [] IONTO  [] NMR (93985) x      [] VASO  [x] Manual (10011) x  1    [] Other:  [] TA x       [] Mech Traction (23884)  [] ES(attended) (62718)      [x] ES (un) (05066):     GOALS: Patient stated goal: \"get out of pain\"    Therapist goals for Patient:   Short Term Goals: To be achieved in: 2 weeks  1. Independent in HEP and progression per patient tolerance, in order to prevent re-injury. 2. Patient will have a decrease in pain to facilitate improvement in movement, function, and ADLs as indicated by Functional Deficits. Long Term Goals: To be achieved in: 6 weeks  1.  Disability index score of 28% or less for

## 2017-12-01 ENCOUNTER — HOSPITAL ENCOUNTER (OUTPATIENT)
Dept: PHYSICAL THERAPY | Age: 52
Discharge: HOME | End: 2017-12-29
Attending: FAMILY MEDICINE | Admitting: FAMILY MEDICINE

## 2017-12-11 ENCOUNTER — HOSPITAL ENCOUNTER (OUTPATIENT)
Dept: PHYSICAL THERAPY | Age: 52
Discharge: HOME | End: 2017-12-11
Admitting: FAMILY MEDICINE

## 2017-12-11 DIAGNOSIS — M72.2 PLANTAR FASCIITIS OF LEFT FOOT: Primary | ICD-10-CM

## 2017-12-11 RX ORDER — DEXAMETHASONE SODIUM PHOSPHATE 4 MG/ML
INJECTION, SOLUTION INTRA-ARTICULAR; INTRALESIONAL; INTRAMUSCULAR; INTRAVENOUS; SOFT TISSUE
Qty: 30 ML | Refills: 0 | Status: SHIPPED | OUTPATIENT
Start: 2017-12-11 | End: 2018-01-10

## 2017-12-13 ENCOUNTER — OFFICE VISIT (OUTPATIENT)
Dept: ORTHOPEDIC SURGERY | Age: 52
End: 2017-12-13

## 2017-12-13 VITALS
HEIGHT: 66 IN | DIASTOLIC BLOOD PRESSURE: 68 MMHG | SYSTOLIC BLOOD PRESSURE: 119 MMHG | WEIGHT: 235.89 LBS | HEART RATE: 87 BPM | BODY MASS INDEX: 37.91 KG/M2

## 2017-12-13 DIAGNOSIS — M79.672 LEFT FOOT PAIN: ICD-10-CM

## 2017-12-13 DIAGNOSIS — M21.42 PES PLANUS OF BOTH FEET: ICD-10-CM

## 2017-12-13 DIAGNOSIS — M72.2 PLANTAR FASCIITIS OF LEFT FOOT: Primary | ICD-10-CM

## 2017-12-13 DIAGNOSIS — L03.031 PARONYCHIA OF TOENAIL OF RIGHT FOOT: ICD-10-CM

## 2017-12-13 DIAGNOSIS — S76.311D RIGHT HAMSTRING MUSCLE STRAIN, SUBSEQUENT ENCOUNTER: ICD-10-CM

## 2017-12-13 DIAGNOSIS — M21.41 PES PLANUS OF BOTH FEET: ICD-10-CM

## 2017-12-13 PROCEDURE — 3017F COLORECTAL CA SCREEN DOC REV: CPT | Performed by: FAMILY MEDICINE

## 2017-12-13 PROCEDURE — 3014F SCREEN MAMMO DOC REV: CPT | Performed by: FAMILY MEDICINE

## 2017-12-13 PROCEDURE — 99214 OFFICE O/P EST MOD 30 MIN: CPT | Performed by: FAMILY MEDICINE

## 2017-12-13 PROCEDURE — G8427 DOCREV CUR MEDS BY ELIG CLIN: HCPCS | Performed by: FAMILY MEDICINE

## 2017-12-13 PROCEDURE — 1036F TOBACCO NON-USER: CPT | Performed by: FAMILY MEDICINE

## 2017-12-13 PROCEDURE — G8484 FLU IMMUNIZE NO ADMIN: HCPCS | Performed by: FAMILY MEDICINE

## 2017-12-13 PROCEDURE — G8417 CALC BMI ABV UP PARAM F/U: HCPCS | Performed by: FAMILY MEDICINE

## 2017-12-13 RX ORDER — PREDNISONE 20 MG/1
TABLET ORAL
Qty: 20 TABLET | Refills: 0 | Status: SHIPPED | OUTPATIENT
Start: 2017-12-13 | End: 2018-01-10

## 2017-12-13 RX ORDER — TRAMADOL HYDROCHLORIDE 50 MG/1
50 TABLET ORAL EVERY 6 HOURS PRN
Qty: 60 TABLET | Refills: 0 | Status: SHIPPED | OUTPATIENT
Start: 2017-12-13 | End: 2017-12-23

## 2017-12-13 NOTE — PROGRESS NOTES
Chief Complaint  Foot Pain (CK LEFT FOOT)    Follow-up left heel and plantar fascial pain with right gluteal hamstring pain with lateral pain and difficulty walking with worsening right great toenail pain and history of paronychia    History of Present Illness:  Julita Cross is a 45 y.o. 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 Titi Perry well known to us who has a history of fibromyalgia and chronic mechanical back pain and possible facet mediated pain as well as right trochanter bursitis who is being seen today for 2 separate orthopedic issues. She states that she had been recovering over the last several months from treatment of a paronychia to her left great toe with Dr. Minerva Paz was required to wear a postop shoe. Since late September 2017 she has noticed increasing pain to her posterior left heel with tightness to her plantar fascia and morning stiffness. There is no history of trauma. Having to wear her postop shoe and with gait alteration, she has noticed increasing pain to her right gluteal region as well as lateral aspect of her right hip. She has been having hamstring tightness and soreness and difficulty with prolonged standing and walking distances. Since roughly 10/5/2017 she has been seeing her therapist Hussein Gayle for dry needling and he was concerned about proximal hamstring tendinitis versus IT band. He did recommend this orthopedic consultation. She has continued with her Naprosyn twice daily and is trying to stretch. He'll become progressively worse prompting today's consultation. She does complain of an achy pain in her hamstring at 3-4/10 which are for 6-7 out of 10 pain coming home from work after prolonged standing and walking. She did have an MRI performed back in 2015 showing a small L4 5 and 3 mm retrolisthesis with possible abutment of her left S1 nerve. .  She is and person. The patient's mood and affect are appropriate. Lymphatic: The lymphatic examination bilaterally reveals all areas to be without enlargement or induration. Vascular: Examination reveals no swelling or calf tenderness. Peripheral pulses are palpable and 2+. Neurological: The patient has good coordination. There is no weakness or sensory deficit. Upper Leg Examination  Inspection:  There is no high-grade deformity soft tissue swelling or ecchymosis or palpable spasm. Palpation:  She does have tenderness over the proximal hamstring which does worsen with resisted hip extension and the flexion. She does have tenderness over the trochanteric bursa and IT band as well. Rang of Motion:  25% hip range of motion loss. Strength:  Weakness 4-5 with resisted knee flexion and hip extension on the right. Special Tests:  Her straight leg raising can logroll testing appear to be negative. She does have a trace positive Wan's test and some tenderness over the IT band. Mild discomfort with impingement testing. Skin: There are no rashes, ulcerations or lesions. Distal motor sensory and vascular exam is intact. Gait: Reasonably fluid gait with overpronation. Reflex symmetrically preserved. Additional Comments: She does have ongoing tenderness to her left heel of the plantar fascial insertion over the medial calcaneal tubercle. She is tight with regard to her Achilles as well as her plantar fascia. There is no evidence of instability. Strength testing about the ankle is intact. She does have some tenderness mildly over the medial and lateral patellofemoral facet. She is only mildly positive grind testing and she does not exhibit high-grade joint line tenderness. There is no evidence of effusion. She does have reasonable motion with tightness to her hamstrings. No evidence of instability. Screening left hip testing is benign.     Evaluation of her right foot and great toe does reveal some minimal erythema without active drainage to the lateral nail fold of the great toe. There is no purulent drainage. No evidence of the space infection. It looks as if she has had a partial distal wedge resection in the past.  Reasonable motion and flexor and extensor tendon function appears to be intact. Additional Examinations:  Contralateral Exam: Examination of the left hip reveals intact skin. The patient demonstrates full painless range of motion with regards to flexion, abduction, internal and external rotation. There is not tenderness about the greater trochanter. There is a negative straight leg raise against resistance. Strength is 5/5 thorough out all planes. Contralateral right heel exam is benign. Right Lower Extremity: Examination of the right lower extremity does not show any tenderness, deformity or injury. Range of motion is unremarkable. There is no gross instability. There are no rashes, ulcerations or lesions. Strength and tone are normal.  Left Lower Extremity: Examination of the left lower extremity does not show any tenderness, deformity or injury. Range of motion is unremarkable. There is no gross instability. There are no rashes, ulcerations or lesions. Strength and tone are normal.      Diagnostic Test Findings:      Assessment :  #1.  3 months post symptomatic right hamstring strain/tendinitis with right trochanteric bursitis and IT band   #2. Persistent recurrent Left plantar fasciitis with overpronation. #3. Low-grade aggravation left knee reactive patellofemoral compression syndrome. #4.  History of mechanical low back pain with lumbar degenerative disc disease and history of facet mediated pain and fibromyalgia  #5. Partially treated right great toe paronychia with ongoing pain    Impression:  Encounter Diagnoses   Name Primary?     Plantar fasciitis of left foot Yes    Left foot pain     Pes planus of both feet     Paronychia of toenail of right Ron for rheumatologic evaluation. This dictation was performed with a verbal recognition program (DRAGON) and it was checked for errors. It is possible that there are still dictated errors within this office note. If so, please bring any errors to my attention for an addendum. All efforts were made to ensure that this office note is accurate.

## 2017-12-13 NOTE — PATIENT INSTRUCTIONS
If you're currently taking an anti-inflammatory such as advil, aleve, ibuprofen, diclofenac, naproxen, meloxicam, celebrex, or nabumetome, please stop. Take prednisone first for 13 days. This is a steroid pack and will last for 13 days.      Once you are finished with the medrol, then you may re-start or start your anti-inflammatory: Naproxen    Referral to Dr. Evangelist Her

## 2017-12-14 DIAGNOSIS — M79.7 FIBROMYALGIA: Primary | ICD-10-CM

## 2017-12-18 ENCOUNTER — TELEPHONE (OUTPATIENT)
Dept: INTERNAL MEDICINE CLINIC | Age: 52
End: 2017-12-18

## 2017-12-18 ENCOUNTER — HOSPITAL ENCOUNTER (OUTPATIENT)
Dept: PHYSICAL THERAPY | Age: 52
Discharge: HOME | End: 2017-12-18
Admitting: FAMILY MEDICINE

## 2017-12-18 NOTE — FLOWSHEET NOTE
[facility], [address] [place]  Phone: [phone]  Fax [phone]    Physical Therapy Daily Treatment Note  Date:  2017    Patient Name:  Kim Aguiar    :  1965  MRN: [id number]  Restrictions/Precautions:    Medical/Treatment Diagnosis Information:  · Diagnosis: M25.562 (ICD-10-CM) - Acute pain of left knee, M79.604 (ICD-10-CM) - Right leg pain, M72.2 (ICD-10-CM) - Plantar fasciitis of left foot, M21.41, M21.42 (ICD-10-CM) - Pes planus of both feet,M76.31 (ICD-10-CM) - Iliotibial band syndrome, right   ·  M25.562 (ICD-10-CM) - Acute pain of left knee, M79.604 (ICD-10-CM) - Right leg pain, M72.2 (ICD-10-CM) - Plantar fasciitis of left foot, M21.41, M21.42 (ICD-10-CM) - Pes planus of both feet,M76.31 (ICD-10-CM) - Iliotibial band syndrome, right      Insurance/Certification information:  PT Insurance Information: [facility]: 20 visits for the year, 10 used already  Physician Information:  Dr. Alex Mccarty of care signed (Y/N):     Date of Patient follow up with Physician: Dr Fuentes Martines prn    G-Code (if applicable):      Date G-Code Applied:         Progress Note: [x]  Yes  []  No  Next due by: Visit #10       Latex Allergy:  [x]NO      []YES  Preferred Language for Healthcare:   [x]English       []other:    Visit # Insurance Allowable   9/10 20 (10 used previously)     Pain level:  6/10 (left heel) and 6/10 (right hip/buttock)     SUBJECTIVE:  Patient reports she sees Dr. Ken Cuellar tomorrow for her toenail. Had dry needling in her heel today and she was told it had a large release. Felt ok after last therapy session. Has one more approved PT visit. She saw Dr. Fuentes Martines last week and he has referred her to a rheumatologist to be worked up for Fibromyalgia. She is off work until  from Dr. Fuentes Martines.   OBJECTIVE: : we discussed need to stay faithful with exercises, including strengthening, or she would continue to have issues. Also gave her the [facility] group fitness schedule  Observation:   Test measurements:      RESTRICTIONS/PRECAUTIONS: none noted    Exercises/Interventions: see AT sheet    Therapeutic Ex Sets/reps Notes   Supine knee to opp shoulder stretch 3x30 sec bilat hep   hep   3 d gastroc stretches on incline 30 sec each way bilat hep   hooklying hip abd with band with ab brace Green versaloop 3 sec 3x10 Hep- 11/20- blue band for home      hep   SAQ with add squeeze 3# 3 sec 2x10 bilat                                           Manual Intervention     Prone low back general mobilizations Grade 2-3 x 5 min    STM proximal ham, ITB, gluteals 3 min Tender to prox ham, gluteals and piriformis   Passive stretches to bilat ham/ right piriformis and ITB/ lower back rotational stretch 8 min    STM to left plantar fascia 5 min              NMR re-education                                       Therapeutic Exercise and NMR EXR  [x] (62042) Provided verbal/tactile cueing for activities related to strengthening, flexibility, endurance, ROM for improvements in LE, proximal hip, and core control with self care, mobility, lifting, ambulation.  [] (37941) Provided verbal/tactile cueing for activities related to improving balance, coordination, kinesthetic sense, posture, motor skill, proprioception  to assist with LE, proximal hip, and core control in self care, mobility, lifting, ambulation and eccentric single leg control.      NMR and Therapeutic Activities:    [] (48325 or 57432) Provided verbal/tactile cueing for activities related to improving balance, coordination, kinesthetic sense, posture, motor skill, proprioception and motor activation to allow for proper function of core, proximal hip and LE with self care and ADLs  [] (73035) Gait Re-education- Provided training and instruction to the patient for proper LE, core and proximal hip recruitment and positioning and eccentric body weight control with ambulation re-education including up and down stairs     Home Exercise Program:    [x] (89793) Reviewed/Progressed HEP activities related to strengthening, flexibility, endurance, ROM of core, proximal hip and LE for functional self-care, mobility, lifting and ambulation/stair navigation   [] (27183)Reviewed/Progressed HEP activities related to improving balance, coordination, kinesthetic sense, posture, motor skill, proprioception of core, proximal hip and LE for self care, mobility, lifting, and ambulation/stair navigation      Manual Treatments:  PROM / STM / Oscillations-Mobs:  G-I, II, III, IV (PA's, Inf., Post.)  [x] (37065) Provided manual therapy to mobilize LE, proximal hip and/or LS spine soft tissue/joints for the purpose of modulating pain, promoting relaxation,  increasing ROM, reducing/eliminating soft tissue swelling/inflammation/restriction, improving soft tissue extensibility and allowing for proper ROM for normal function with self care, mobility, lifting and ambulation. Modalities:  CP/stim to right buttock area prone lying x 15 min, Iontophoresis 120mAmp/min to left heel    Charges:  Timed Code Treatment Minutes: 38   Total Treatment Minutes: 65     [] EVAL (LOW) 95311 (typically 20 minutes face-to-face)  [] EVAL (MOD) 36884 (typically 30 minutes face-to-face)  [] EVAL (HIGH) 01122 (typically 45 minutes face-to-face)  [] RE-EVAL     [x] KM(08245) x  1   [x] IONTO  [] NMR (87379) x      [] VASO  [x] Manual (23720) x  1    [] Other:  [] TA x       [] Mech Traction (93955)  [] ES(attended) (52293)      [x] ES (un) (29663):     GOALS: Patient stated goal: \"get out of pain\"    Therapist goals for Patient:   Short Term Goals: To be achieved in: 2 weeks  1. Independent in HEP and progression per patient tolerance, in order to prevent re-injury. 2. Patient will have a decrease in pain to facilitate improvement in movement, function, and ADLs as indicated by Functional Deficits. Long Term Goals:  To be achieved in: 6 weeks  1. Disability index score of 28% or less for the LEFS to assist with reaching prior level of function. 2. Patient will demonstrate an increase in Strength to good proximal hip strength and control, within 5lb HHD in LE to allow for proper functional mobility as indicated by patients Functional Deficits. 3. Patient will return to adl functional activities without increased symptoms or restriction. 4. Decrease left foot and right hip/buttock pain from 7-8/10 to 3-4/10       Progression Towards Functional goals:  [] Patient is progressing as expected towards functional goals listed. [x] Progression is slowed due to complexities listed. [] Progression has been slowed due to co-morbidities. [] Plan just implemented, too soon to assess goals progression  [] Other:     ASSESSMENT:  See eval    Treatment/Activity Tolerance:  [x] Patient tolerated treatment well [] Patient limited by fatique  [] Patient limited by pain  [] Patient limited by other medical complications  [x] Other: progress toward goals is slow due to multiple issues with bilat lower extremities and nature of her job (on her feet for many hours)    Prognosis: [] Good [] Fair  [] Poor    Patient Requires Follow-up: [x] Yes  [] No    PLAN: Cont PT 1x/week for 2 more weeks.   Patient will schedule follow up with Dr. Kelle Leiva  [x] Continue per plan of care [] Alter current plan (see comments)  [] Plan of care initiated [] Hold pending MD visit [] Discharge    Electronically signed by: Layla Burnett PT

## 2017-12-18 NOTE — TELEPHONE ENCOUNTER
Referral from Dr Jesse Ramirez ---pt called back made NP appt for 12/21 at [facility] requested for Fibromyalgia---records should be in epic.

## 2017-12-19 ENCOUNTER — OFFICE VISIT (OUTPATIENT)
Dept: ORTHOPEDIC SURGERY | Age: 52
End: 2017-12-19

## 2017-12-19 VITALS
HEART RATE: 74 BPM | HEIGHT: 66 IN | WEIGHT: 235.89 LBS | SYSTOLIC BLOOD PRESSURE: 128 MMHG | BODY MASS INDEX: 37.91 KG/M2 | DIASTOLIC BLOOD PRESSURE: 78 MMHG

## 2017-12-19 DIAGNOSIS — L60.0 ONYCHOCRYPTOSIS: Primary | ICD-10-CM

## 2017-12-19 PROBLEM — R73.9 HYPERGLYCEMIA: Status: ACTIVE | Noted: 2017-11-26

## 2017-12-19 PROCEDURE — 3017F COLORECTAL CA SCREEN DOC REV: CPT | Performed by: PODIATRIST

## 2017-12-19 PROCEDURE — G8417 CALC BMI ABV UP PARAM F/U: HCPCS | Performed by: PODIATRIST

## 2017-12-19 PROCEDURE — G8427 DOCREV CUR MEDS BY ELIG CLIN: HCPCS | Performed by: PODIATRIST

## 2017-12-19 PROCEDURE — 1036F TOBACCO NON-USER: CPT | Performed by: PODIATRIST

## 2017-12-19 PROCEDURE — 99212 OFFICE O/P EST SF 10 MIN: CPT | Performed by: PODIATRIST

## 2017-12-19 PROCEDURE — G8484 FLU IMMUNIZE NO ADMIN: HCPCS | Performed by: PODIATRIST

## 2017-12-19 PROCEDURE — 3014F SCREEN MAMMO DOC REV: CPT | Performed by: PODIATRIST

## 2017-12-19 RX ORDER — ZOLPIDEM TARTRATE 10 MG/1
10 TABLET ORAL NIGHTLY PRN
COMMUNITY
Start: 2017-12-11

## 2017-12-19 RX ORDER — METHOCARBAMOL 750 MG/1
750 TABLET, FILM COATED ORAL PRN
COMMUNITY
Start: 2017-12-13

## 2017-12-19 RX ORDER — ZOLPIDEM TARTRATE 10 MG/1
10 TABLET ORAL
COMMUNITY
End: 2018-02-07

## 2017-12-19 RX ORDER — CLONAZEPAM 0.5 MG/1
0.5 TABLET ORAL PRN
COMMUNITY

## 2017-12-19 RX ORDER — METFORMIN HYDROCHLORIDE 500 MG/1
500 TABLET, EXTENDED RELEASE ORAL
COMMUNITY
Start: 2017-09-29 | End: 2018-02-07

## 2017-12-19 RX ORDER — GABAPENTIN 300 MG/1
CAPSULE ORAL
COMMUNITY
Start: 2017-11-14

## 2017-12-19 RX ORDER — RABEPRAZOLE SODIUM 20 MG/1
20 TABLET, DELAYED RELEASE ORAL DAILY
COMMUNITY
Start: 2017-11-16

## 2017-12-27 ENCOUNTER — HOSPITAL ENCOUNTER (OUTPATIENT)
Dept: PHYSICAL THERAPY | Age: 52
Discharge: HOME | End: 2017-12-27
Admitting: FAMILY MEDICINE

## 2017-12-27 NOTE — FLOWSHEET NOTE
[facility], [address]  Phone: [phone]  Fax [phone]    ATHLETIC TRAINING [address]   Date:  2017    Patient Name:  Aleksandar Ashley    :  1965  MRN: [id number]  Restrictions/Precautions:    Medical/Treatment Diagnosis Information:  ·   Diagnosis: M25.562 (ICD-10-CM) - Acute pain of left knee, M79.604 (ICD-10-CM) - Right leg pain, M72.2 (ICD-10-CM) - Plantar fasciitis of left foot, M21.41, M21.42 (ICD-10-CM) - Pes planus of both feet,M76.31 (ICD-10-CM) - Iliotibial band syndrome, right   ·   M25.562 (ICD-10-CM) - Acute pain of left knee, M79.604 (ICD-10-CM) - Right leg pain, M72.2 (ICD-10-CM) - Plantar fasciitis of left foot, M21.41, M21.42 (ICD-10-CM) - Pes planus of both feet,M76.31 (ICD-10-CM) - Iliotibial band syndrome, right   Physician Information:    Dr. Kamille Miranda                                                               DOS/DOI:                                                             Date: 17   ATC Communication: furniture store sales, standing all day Tolerated additional exercises after last visit. Heel sore today Doing ok today, heel sore through some of the exercises (was dry needled there today prior to visit) Last day today   Bike      Airdyne      Elliptical      Treadmill            Hamstring stretch  R/L knee ext/flex 10x ea R/L knee ext/flex 10x ea   Quad stretch      Hip flexor stretch      Piriformis stretch      Gastroc stretch      Soleus stretch            Leg press  Bilat. Unilat. Gliders R/L lateral 2x5  Gliders R/L post 2x5 ea, on straight leg (no squat) Gliders R/L lat, post 2x5 ea  Gliders R/L lat, post 2x5 ea   Knee ext. Knee flex.        Lat band walk YTB at knees, 2 laps 1/3 clinic floor Lat band walk OVL at knees, 2 laps 1/3 clinic floor Lat band walk OVL at knees, 2 laps 1/3 clinic floor   Squats   Mini YTB 15x OVL 15x OVL 2x10                  Wall                     [facility]            Step   Up rxx, lxx ball into wall (hip abd) 2x5 5\" H rxx,lxx R/L ant knee drives w/ATC pulling OVL at knee into add 15x ea rxx,lxx R/L ant knee drives w/ATC pulling OVL at knee into add 15x ea              Up and Over                 Lat. Down                       [facility] [place]  Flex. stand on Airex xnx, march R/L 12x B RTB ext w/R/L slow march 10x B RTB ext w/R/L slow march 10x2             ABd                ADd                Ext            Cable Column/Theraband    Rows                                                    Ext. Lat. pulldown                                                    Chops                                                    Trunk Rot.             Reformer FW Walking 2R 10x 2R 12x 2R 15x   Reformer FW Heel Dips 2R 10x 2R 12x ball add 2R 15x ball add   Reformer FW Parallel Toes 2R 2x10 w/ring abd 2R 2x10 ring abd 2R 15x ring abd   Reformer FW V Toes 2R 10x 2R 12x 2R 15x   Reformer FW Parallel Heels  2R ball add 12x 2R 15x ball add         Modality ES/CP 15' ES/CP 15' ES/CP 15'   Initials JLW JLW JLW

## 2017-12-27 NOTE — FLOWSHEET NOTE
[facility], [address]  Phone: [phone]  Fax [phone]    Physical Therapy Daily Treatment Note  Date:  2017    Patient Name:  Wei Barfield    :  1965  MRN: [id number]  Restrictions/Precautions:    Medical/Treatment Diagnosis Information:  · Diagnosis: M25.562 (ICD-10-CM) - Acute pain of left knee, M79.604 (ICD-10-CM) - Right leg pain, M72.2 (ICD-10-CM) - Plantar fasciitis of left foot, M21.41, M21.42 (ICD-10-CM) - Pes planus of both feet,M76.31 (ICD-10-CM) - Iliotibial band syndrome, right   ·  M25.562 (ICD-10-CM) - Acute pain of left knee, M79.604 (ICD-10-CM) - Right leg pain, M72.2 (ICD-10-CM) - Plantar fasciitis of left foot, M21.41, M21.42 (ICD-10-CM) - Pes planus of both feet,M76.31 (ICD-10-CM) - Iliotibial band syndrome, right      Insurance/Certification information:  PT Insurance Information: [facility]: 20 visits for the year, 10 used already  Physician Information:  Dr. Mayank Badillo of care signed (Y/N):     Date of Patient follow up with Physician: Dr Larrie Nageotte prn    G-Code (if applicable):      Date G-Code Applied:    PT G-Codes  Functional Assessment Tool Used: LEFS  Score: 65%  Functional Limitation: Mobility: Walking and moving around  Mobility: Walking and Moving Around Current Status (): At least 60 percent but less than 80 percent impaired, limited or restricted  Mobility: Walking and Moving Around Goal Status (): At least 20 percent but less than 40 percent impaired, limited or restricted  Mobility: Walking and Moving Around Discharge Status ():  At least 60 percent but less than 80 percent impaired, limited or restricted    Progress Note: [x]  Yes  []  No  Next due by: Visit #10       Latex Allergy:  [x]NO      []YES  Preferred Language for Healthcare:   [x]English       []other:    Visit # Insurance Allowable   10/10 20 (10 used previously)     Pain level: 5-6/10 balance, coordination, kinesthetic sense, posture, motor skill, proprioception  to assist with LE, proximal hip, and core control in self care, mobility, lifting, ambulation and eccentric single leg control. NMR and Therapeutic Activities:    [] (50979 or 90165) Provided verbal/tactile cueing for activities related to improving balance, coordination, kinesthetic sense, posture, motor skill, proprioception and motor activation to allow for proper function of core, proximal hip and LE with self care and ADLs  [] (57271) Gait Re-education- Provided training and instruction to the patient for proper LE, core and proximal hip recruitment and positioning and eccentric body weight control with ambulation re-education including up and down stairs     Home Exercise Program:    [x] (39981) Reviewed/Progressed HEP activities related to strengthening, flexibility, endurance, ROM of core, proximal hip and LE for functional self-care, mobility, lifting and ambulation/stair navigation   [] (61916)Reviewed/Progressed HEP activities related to improving balance, coordination, kinesthetic sense, posture, motor skill, proprioception of core, proximal hip and LE for self care, mobility, lifting, and ambulation/stair navigation      Manual Treatments:  PROM / STM / Oscillations-Mobs:  G-I, II, III, IV (PA's, Inf., Post.)  [x] (57334) Provided manual therapy to mobilize LE, proximal hip and/or LS spine soft tissue/joints for the purpose of modulating pain, promoting relaxation,  increasing ROM, reducing/eliminating soft tissue swelling/inflammation/restriction, improving soft tissue extensibility and allowing for proper ROM for normal function with self care, mobility, lifting and ambulation.      Modalities:  CP/stim to right buttock area prone lying x 15 min,     Charges:  Timed Code Treatment Minutes: 38   Total Treatment Minutes: 65     [] EVAL (LOW) 91149 (typically 20 minutes face-to-face)  [] EVAL (MOD) 30539 (typically 30 minutes face-to-face)  [] EVAL (HIGH) 71012 (typically 45 minutes face-to-face)  [] RE-EVAL     [x] LY(65016) x  2   [x] IONTO  [] NMR (93255) x      [] VASO  [x] Manual (47741) x  1    [] Other:  [] TA x       [] Mech Traction (31504)  [] ES(attended) (39410)      [x] ES (un) (20172):     GOALS: Patient stated goal: \"get out of pain\"    Therapist goals for Patient:   Short Term Goals: To be achieved in: 2 weeks  1. Independent in HEP and progression per patient tolerance, in order to prevent re-injury. 2. Patient will have a decrease in pain to facilitate improvement in movement, function, and ADLs as indicated by Functional Deficits. Long Term Goals: To be achieved in: 6 weeks  1. Disability index score of 28% or less for the LEFS to assist with reaching prior level of function. (not met)  2. Patient will demonstrate an increase in Strength to good proximal hip strength and control, within 5lb HHD in LE to allow for proper functional mobility as indicated by patients Functional Deficits. 3. Patient will return to adl functional activities without increased symptoms or restriction. (not met)  4. Decrease left foot and right hip/buttock pain from 7-8/10 to 3-4/10. (progressing toward goal)        Progression Towards Functional goals:  [] Patient is progressing as expected towards functional goals listed. [x] Progression is slowed due to complexities listed. [] Progression has been slowed due to co-morbidities.   [] Plan just implemented, too soon to assess goals progression  [] Other:     ASSESSMENT:  See eval    Treatment/Activity Tolerance:  [x] Patient tolerated treatment well [] Patient limited by fatique  [] Patient limited by pain  [] Patient limited by other medical complications  [x] Other: progress toward goals is slow due to multiple issues with bilat lower extremities and nature of her job (on her feet for many hours)    Prognosis: [] Good [] Fair  [] Poor    Patient Requires Follow-up: [x] Yes  [] No    PLAN: D/C due to end of year and PT benefits running out, and patient has plateaued.    [] Continue per plan of care [] Alter current plan (see comments)  [] Plan of care initiated [] Hold pending MD visit [x] Discharge    Electronically signed by: Brad Morfin, PT

## 2017-12-27 NOTE — DISCHARGE SUMMARY
[facility],  [address] [place]  Phone: [phone]  Fax [phone]       Physical Therapy Discharge  Date: 2017        Patient Name:  Cynthia Anderson    :  1965  MRN: [id number]  Referring Physician:Gwyn  Diagnosis:Acute pain of left knee, M79.604 (ICD-10-CM) - Right leg pain, M72.2 (ICD-10-CM) - Plantar fasciitis of left foot, M21.41, M21.42 (ICD-10-CM) - Pes planus of both feet,M76.31 (ICD-10-CM) - Iliotibial band syndrome, right                    [] Surgical [x] Conservative  Therapy Diagnosis/Practice Pattern:     Number of Comorbidities:  []0     [x]1-2    []3+  Total number of visits: 10   Reporting Period:   Beginning Date:10/23/17   End Date:17    OBJECTIVE  Test used Initial score Discharge Score   Pain Summary  8/10 6/10   Functional questionnaire LEFS 61% 65%   ROM bilat LE WNL WNL   Strength Left/ right      Hip abd 4+/5, 4/5 4/5, 4/5    Knee ext 5/5, 5/5 5/5, 5/5    Knee flex 4/5, 4/5 4+/5, 4+/5              Functional Limitation G-Code (if applicable):         PT G-Codes  Functional Assessment Tool Used: LEFS  Score: 65%  Functional Limitation: Mobility: Walking and moving around  Mobility: Walking and Moving Around Current Status (): At least 60 percent but less than 80 percent impaired, limited or restricted  Mobility: Walking and Moving Around Goal Status (): At least 20 percent but less than 40 percent impaired, limited or restricted  Mobility: Walking and Moving Around Discharge Status ():  At least 60 percent but less than 80 percent impaired, limited or restricted   Test/tests used to determine % limitation:  Actual Score used to drive % limitation:    Treatment to date:  [x] Therapeutic Exercise    [x] Modalities:  [] Therapeutic Activity             []Ultrasound            [x]Electrical Stimulation  [] Gait Training     []Cervical Traction    [] Lumbar Traction  [x] Neuromuscular Re-education [x] Cold/hotpack         [x]Iontophoresis  [x] Instruction in HEP      Other:  [x] Manual Therapy                   []                       ?           []   Assessment:  [] All Goals were achieved. [] The following goals were achieved (#'s):  [] The following goals were not achieved for the following reasons:  Summary/assessmen of improvement as it relates to each goal:no goals were achieved. Plan of Care:  [x] Discharge from Therapy Services due to:    Reason for Discharge:   [] All goals achieved    [] Patient having surgery  [] Physician discontinued therapy  [x] Insurance/Financial Limitations [] Patient did not return for follow up visits [] Home program/1 visit only   [] No subjective or objective improvement [x] Plateaued   [] Patient was unable to adhere to the plan of care established at evaluation. [] Referred back to physician for re-evaluation and did not return.      [] Other:?       Electronically signed by:  Yon Odom PT

## 2018-01-10 ENCOUNTER — OFFICE VISIT (OUTPATIENT)
Dept: ORTHOPEDIC SURGERY | Age: 53
End: 2018-01-10

## 2018-01-10 VITALS — WEIGHT: 235.89 LBS | BODY MASS INDEX: 37.91 KG/M2 | HEIGHT: 66 IN

## 2018-01-10 DIAGNOSIS — M72.2 PLANTAR FASCIITIS OF LEFT FOOT: ICD-10-CM

## 2018-01-10 DIAGNOSIS — M21.42 PES PLANUS OF BOTH FEET: ICD-10-CM

## 2018-01-10 DIAGNOSIS — S76.011A STRAIN OF FLEXOR MUSCLE OF RIGHT HIP, INITIAL ENCOUNTER: ICD-10-CM

## 2018-01-10 DIAGNOSIS — L03.031 PARONYCHIA OF TOENAIL OF RIGHT FOOT: ICD-10-CM

## 2018-01-10 DIAGNOSIS — M25.561 ACUTE PAIN OF RIGHT KNEE: ICD-10-CM

## 2018-01-10 DIAGNOSIS — M25.562 ACUTE PAIN OF LEFT KNEE: Primary | ICD-10-CM

## 2018-01-10 DIAGNOSIS — M21.41 PES PLANUS OF BOTH FEET: ICD-10-CM

## 2018-01-10 PROCEDURE — G8417 CALC BMI ABV UP PARAM F/U: HCPCS | Performed by: FAMILY MEDICINE

## 2018-01-10 PROCEDURE — 99214 OFFICE O/P EST MOD 30 MIN: CPT | Performed by: FAMILY MEDICINE

## 2018-01-10 PROCEDURE — 1036F TOBACCO NON-USER: CPT | Performed by: FAMILY MEDICINE

## 2018-01-10 PROCEDURE — G8484 FLU IMMUNIZE NO ADMIN: HCPCS | Performed by: FAMILY MEDICINE

## 2018-01-10 PROCEDURE — 20550 NJX 1 TENDON SHEATH/LIGAMENT: CPT | Performed by: FAMILY MEDICINE

## 2018-01-10 PROCEDURE — G8427 DOCREV CUR MEDS BY ELIG CLIN: HCPCS | Performed by: FAMILY MEDICINE

## 2018-01-10 PROCEDURE — 3017F COLORECTAL CA SCREEN DOC REV: CPT | Performed by: FAMILY MEDICINE

## 2018-01-10 PROCEDURE — 3014F SCREEN MAMMO DOC REV: CPT | Performed by: FAMILY MEDICINE

## 2018-01-10 NOTE — PROGRESS NOTES
Chief Complaint  Foot Pain (Left foot)    Follow-up left heel and plantar fascial pain with right gluteal hamstring pain with lateral pain and difficulty walking with worsening right great toenail pain and history of paronychia    History of Present Illness:  Estefanía Helm is a 45 y.o. 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 Harman Victor well known to us who has a history of fibromyalgia and chronic mechanical back pain and possible facet mediated pain as well as right trochanter bursitis who is being seen today for 2 separate orthopedic issues. She states that she had been recovering over the last several months from treatment of a paronychia to her left great toe with Dr. Adrián Pimentel was required to wear a postop shoe. Since late September 2017 she has noticed increasing pain to her posterior left heel with tightness to her plantar fascia and morning stiffness. There is no history of trauma. Having to wear her postop shoe and with gait alteration, she has noticed increasing pain to her right gluteal region as well as lateral aspect of her right hip. She has been having hamstring tightness and soreness and difficulty with prolonged standing and walking distances. Since roughly 10/5/2017 she has been seeing her therapist Jacqueline Watt for dry needling and he was concerned about proximal hamstring tendinitis versus IT band. He did recommend this orthopedic consultation. She has continued with her Naprosyn twice daily and is trying to stretch. He'll become progressively worse prompting today's consultation. She does complain of an achy pain in her hamstring at 3-4/10 which are for 6-7 out of 10 pain coming home from work after prolonged standing and walking. She did have an MRI performed back in 2015 showing a small L4 5 and 3 mm retrolisthesis with possible abutment of her left S1 nerve. .  She is seen plateauing with her plantar fascial pain and has had some hip flexor and knee discomfort with her gait alterations. She has been utilizing orthotics and appropriate footwear. She has been contemplating a plantar fascial injection to get her over the hump. Medical History  Patient's medications, allergies, past medical, surgical, social and family histories were reviewed and updated as appropriate. Review of Systems  Pertinent items are noted in HPI  Review of systems reviewed from Patient History Form dated on 10/18/2017. and available in the patient's chart under the Media tab. Vital Signs  There were no vitals filed for this visit. General Exam:     Constitutional: Patient is adequately groomed with no evidence of malnutrition  DTRs: Deep tendon reflexes are intact  Mental Status: The patient is oriented to time, place and person. The patient's mood and affect are appropriate. Lymphatic: The lymphatic examination bilaterally reveals all areas to be without enlargement or induration. Vascular: Examination reveals no swelling or calf tenderness. Peripheral pulses are palpable and 2+. Neurological: The patient has good coordination. There is no weakness or sensory deficit. Upper Leg Examination  Inspection:  There is no high-grade deformity soft tissue swelling or ecchymosis or palpable spasm. Palpation:  She does have less prominent tenderness over the proximal hamstring which does worsen with resisted hip extension and the flexion. She does have less prominent tenderness over the trochanteric bursa and IT band as well. Rang of Motion:  25% hip range of motion loss. Strength:  Weakness 4-5 with resisted knee flexion and hip extension on the right. Special Tests:  Her straight leg raising can logroll testing appear to be negative. She does have a trace positive Wan's test and some tenderness over the IT band. Mild discomfort with impingement testing.     Skin: There are no

## 2018-01-23 ENCOUNTER — TELEPHONE (OUTPATIENT)
Dept: ORTHOPEDIC SURGERY | Age: 53
End: 2018-01-23

## 2018-01-24 ENCOUNTER — OFFICE VISIT (OUTPATIENT)
Dept: ORTHOPEDIC SURGERY | Age: 53
End: 2018-01-24

## 2018-01-24 VITALS — HEIGHT: 66 IN | WEIGHT: 235.01 LBS | BODY MASS INDEX: 37.77 KG/M2

## 2018-01-24 DIAGNOSIS — M17.12 PRIMARY OSTEOARTHRITIS OF LEFT KNEE: ICD-10-CM

## 2018-01-24 DIAGNOSIS — R52 PAIN: Primary | ICD-10-CM

## 2018-01-24 DIAGNOSIS — M22.42 CHONDROMALACIA OF PATELLA, LEFT: ICD-10-CM

## 2018-01-24 DIAGNOSIS — M25.562 ACUTE PAIN OF LEFT KNEE: ICD-10-CM

## 2018-01-24 PROCEDURE — 99214 OFFICE O/P EST MOD 30 MIN: CPT | Performed by: FAMILY MEDICINE

## 2018-01-24 PROCEDURE — G8428 CUR MEDS NOT DOCUMENT: HCPCS | Performed by: FAMILY MEDICINE

## 2018-01-24 PROCEDURE — 3014F SCREEN MAMMO DOC REV: CPT | Performed by: FAMILY MEDICINE

## 2018-01-24 PROCEDURE — G8484 FLU IMMUNIZE NO ADMIN: HCPCS | Performed by: FAMILY MEDICINE

## 2018-01-24 PROCEDURE — 1036F TOBACCO NON-USER: CPT | Performed by: FAMILY MEDICINE

## 2018-01-24 PROCEDURE — G8417 CALC BMI ABV UP PARAM F/U: HCPCS | Performed by: FAMILY MEDICINE

## 2018-01-24 PROCEDURE — 3017F COLORECTAL CA SCREEN DOC REV: CPT | Performed by: FAMILY MEDICINE

## 2018-01-24 RX ORDER — TRAMADOL HYDROCHLORIDE 50 MG/1
50 TABLET ORAL EVERY 6 HOURS PRN
Qty: 28 TABLET | Refills: 0 | Status: SHIPPED | OUTPATIENT
Start: 2018-01-24 | End: 2018-01-31

## 2018-01-24 NOTE — PROGRESS NOTES
Chief Complaint  Knee Pain (Left knee. Seen at ER 1/22/18)    Initial evaluation acute worsening left anterior medial and lateral knee pain with pseudo-buckling episode 1/22/2018    History of Present Illness:  Lucia Pond is a 45 y.o. 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 Agnes Khan well known to us who has a history of fibromyalgia and chronic mechanical back pain and possible facet mediated pain as well as right trochanter bursitis who is being seen today primarily for acute worsening of left anterior lateral and medial knee pain following a pseudo-buckling episode on 1/22/2018. She had been having some achy discomfort and low-grade patellofemoral arthropathy symptoms over the last few months. She had been working on her exercise program but on 1/22/2018 she was at home bringing in a plant when her left knee gave way. She did not actually fall but since that time assessment fairly extreme pain to the anterior lateral and medial portion of her right knee with limited ability to bear weight. She did develop some mild swelling. She was seen in the emergency room where x-rays suggested some mild underlying arthritic change but there is no evidence of fracture. She was placed in the immobilizer and was given instructions to use a walker as she was not able to utilize crutches well. She has been icing elevating and doing relative rest.  She has continued with her Naprosyn. She does have tramadol which she takes periodically for pain but was given some additional pain medications with hydrocodone through the emergency room. Her symptoms got substantially improved. Any attempt at weightbearing is very painful although she does have pain at rest in the range of 5-6 out of 10. She was recently evaluated by Dr. April Arora over agrees with a diagnosis of fibromyalgia.   Her knee does feel a little bit unstable she is not really noticed much in the way of locking or catching. She is seen today for orthopedic and sports consultation. Medical History  Patient's medications, allergies, past medical, surgical, social and family histories were reviewed and updated as appropriate. Review of Systems  Pertinent items are noted in HPI  Review of systems reviewed from Patient History Form dated on 1/24/2018 and available in the patient's chart under the Media tab. Vital Signs  There were no vitals filed for this visit. General Exam:     Constitutional: Patient is adequately groomed with no evidence of malnutrition  DTRs: Deep tendon reflexes are intact  Mental Status: The patient is oriented to time, place and person. The patient's mood and affect are appropriate. Lymphatic: The lymphatic examination bilaterally reveals all areas to be without enlargement or induration. Vascular: Examination reveals no swelling or calf tenderness. Peripheral pulses are palpable and 2+. Neurological: The patient has good coordination. There is no weakness or sensory deficit. Left knee Examination  Inspection:  There is no high-grade deformity though she does have a trace to 1+ knee joint effusion. Palpation:  She does have tenderness over the medial and lateral patellofemoral facet with definite pain with grind testing. She is also tender over the posterior medial joint line and also over the lateral joint line. She does have some distal IT band discomfort as well. Rang of Motion:  She does have pain associated restrictions of motion. She is very stiff with 20° of extension and is guarded. She can flex to about 85. Strength:  Pain associated Weakness 4-5 with resisted knee flexion and and extension    Special Tests:  She does have a definite positive grind test.  Trace positive apprehension testing. Pain with medial and lateral Felisa's. Tenderness is noted over the distal portion of the IT band. normal.      Diagnostic Test Findings:  Left Knee nonweightbearing AP and lateral films reviewed from [facility] on 1/23/2018 which did show evidence of mild tricompartmental osteoarthritis without evidence of acute fracture. We did perform left knee weightbearing PA and sunrise view in the office today which did show moderate medial compartment narrowing with patellofemoral arthropathy. No obvious loose bodies. Assessment :  #1.  2 days status post pseudo-buckling episode with aggravation underlying moderate left knee chondromalacia patella with underlying knee osteoarthritis processes rule out internal derangement)  #2.  3+ months post partially improved right hamstring strain/tendinitis with right trochanteric bursitis and IT band   #3. Partially improved Left plantar fasciitis with overpronation. #4.  History of mechanical low back pain with lumbar degenerative disc disease and history of facet mediated pain and fibromyalgia with recent evaluation by Dr. Dylan Reyes. #5.  Partially treated improving right great toe paronychia that is post recent evaluation with Dr. Ruth Morales:  Encounter Diagnoses   Name Primary?  Pain Yes    Primary osteoarthritis of left knee     Chondromalacia of patella, left     Acute pain of left knee        Office Procedures:  Orders Placed This Encounter   Procedures    XR KNEE LEFT (1-2 VIEWS)     [id number]     Order Specific Question:   Reason for exam:     Answer:   Pain    MRI Knee Left WO Contrast     Scheduling Instructions:      [facility] [address] [facility], [place] #:      TIME AND DATE TBD      PLEASE CALL PATIENT ONCE APPROVED TO SCHEDULE             Remember that it may take several business days to pre-cert your MRI through your insurance. Our office will contact you as soon as we have the approval.             We will not give any test results over the phone.

## 2018-01-24 NOTE — PROGRESS NOTES
Injection administration details:  Date & Time: 1/24/18 11:39 AM  Site & Comments:RIGHT HIP BURSA administered by Dr Rosa Arellano    Betamethasone (30mg/mL)  # of cc: 1  YVA:6099-8376-91   Lot number: [id number]  EXP: 02/2019    Marcaine 0.50%  # of cc: 1  NDC: 5690-4968-81  Lot number: [id number]  EXP: 8/1/2018    1% Lidocaine (10mg/ mL)  # of cc: 1  NDC: 1470-0975-97  Lot number: [id number]  EXP: 3/1/2019

## 2018-01-26 ENCOUNTER — OFFICE VISIT (OUTPATIENT)
Dept: ORTHOPEDIC SURGERY | Age: 53
End: 2018-01-26

## 2018-01-26 VITALS
BODY MASS INDEX: 37.77 KG/M2 | SYSTOLIC BLOOD PRESSURE: 126 MMHG | WEIGHT: 235.01 LBS | DIASTOLIC BLOOD PRESSURE: 80 MMHG | HEIGHT: 66 IN | HEART RATE: 89 BPM

## 2018-01-26 VITALS
WEIGHT: 235.01 LBS | SYSTOLIC BLOOD PRESSURE: 108 MMHG | BODY MASS INDEX: 37.77 KG/M2 | HEART RATE: 88 BPM | DIASTOLIC BLOOD PRESSURE: 61 MMHG | HEIGHT: 66 IN

## 2018-01-26 DIAGNOSIS — M17.12 PRIMARY OSTEOARTHRITIS OF LEFT KNEE: ICD-10-CM

## 2018-01-26 DIAGNOSIS — S83.232A COMPLEX TEAR OF MEDIAL MENISCUS OF LEFT KNEE AS CURRENT INJURY, INITIAL ENCOUNTER: Primary | ICD-10-CM

## 2018-01-26 DIAGNOSIS — M22.42 CHONDROMALACIA OF LEFT PATELLA: ICD-10-CM

## 2018-01-26 PROCEDURE — G8417 CALC BMI ABV UP PARAM F/U: HCPCS | Performed by: FAMILY MEDICINE

## 2018-01-26 PROCEDURE — 3014F SCREEN MAMMO DOC REV: CPT | Performed by: ORTHOPAEDIC SURGERY

## 2018-01-26 PROCEDURE — G8427 DOCREV CUR MEDS BY ELIG CLIN: HCPCS | Performed by: FAMILY MEDICINE

## 2018-01-26 PROCEDURE — 99213 OFFICE O/P EST LOW 20 MIN: CPT | Performed by: FAMILY MEDICINE

## 2018-01-26 PROCEDURE — 99243 OFF/OP CNSLTJ NEW/EST LOW 30: CPT | Performed by: ORTHOPAEDIC SURGERY

## 2018-01-26 PROCEDURE — MISCD110 LATERAL STABILIZER: Performed by: FAMILY MEDICINE

## 2018-01-26 PROCEDURE — G8417 CALC BMI ABV UP PARAM F/U: HCPCS | Performed by: ORTHOPAEDIC SURGERY

## 2018-01-26 PROCEDURE — G8484 FLU IMMUNIZE NO ADMIN: HCPCS | Performed by: ORTHOPAEDIC SURGERY

## 2018-01-26 PROCEDURE — 1036F TOBACCO NON-USER: CPT | Performed by: FAMILY MEDICINE

## 2018-01-26 PROCEDURE — 3017F COLORECTAL CA SCREEN DOC REV: CPT | Performed by: FAMILY MEDICINE

## 2018-01-26 PROCEDURE — 3014F SCREEN MAMMO DOC REV: CPT | Performed by: FAMILY MEDICINE

## 2018-01-26 PROCEDURE — G8427 DOCREV CUR MEDS BY ELIG CLIN: HCPCS | Performed by: ORTHOPAEDIC SURGERY

## 2018-01-26 PROCEDURE — G8484 FLU IMMUNIZE NO ADMIN: HCPCS | Performed by: FAMILY MEDICINE

## 2018-01-26 PROCEDURE — 3017F COLORECTAL CA SCREEN DOC REV: CPT | Performed by: ORTHOPAEDIC SURGERY

## 2018-01-26 NOTE — PROGRESS NOTES
fold of the great toe. There is no purulent drainage. No evidence of the space infection. It looks as if she has had a partial distal wedge resection in the past.  Reasonable motion and flexor and extensor tendon function appears to be intact. Additional Examinations:  Contralateral Exam: Examination of the left hip reveals intact skin. The patient demonstrates full painless range of motion with regards to flexion, abduction, internal and external rotation. There is not tenderness about the greater trochanter. There is a negative straight leg raise against resistance. Strength is 5/5 thorough out all planes. Contralateral right heel exam is benign. Right Lower Extremity: Examination of the right lower extremity does not show any tenderness, deformity or injury. Range of motion is unremarkable. There is no gross instability. There are no rashes, ulcerations or lesions. Strength and tone are normal.  Left Lower Extremity: Examination of the left lower extremity does not show any tenderness, deformity or injury. Range of motion is unremarkable. There is no gross instability. There are no rashes, ulcerations or lesions. Strength and tone are normal.      Diagnostic Test Findings:  MRI left knee obtained 1/24/2018 as listed above  CONCLUSION:    1. Focal grade 4 chondromalacia at apex of the patella with deep osteochondral erosion. Grade 4    chondromalacia at the peripheral half of lateral trochlea with full thickness chondral loss    and osteochondral erosion. 2. Grade 2-3 chondromalacia at the weightbearing medial femoral condyle with chondral thinning    and fraying. A trizonal radial tear at the posterior root of the medial meniscus. Assessment :  #1.  4 days status post pseudo-buckling episode with aggravation underlying MRI documented focal grade 4 left knee chondromalacia patella with underlying grade 2-3.   Compartment knee osteoarthritis with complex radial tear medial meniscus #2.  3+ months post partially improved right hamstring strain/tendinitis with right trochanteric bursitis and IT band   #3. Partially improved Left plantar fasciitis with overpronation. #4.  History of mechanical low back pain with lumbar degenerative disc disease and history of facet mediated pain and fibromyalgia with recent evaluation by Dr. Trace Hua. #5.  Partially treated improving right great toe paronychia that is post recent evaluation with Dr. Sancho Rios:  Encounter Diagnoses   Name Primary?  Complex tear of medial meniscus of left knee as current injury, initial encounter Yes    Primary osteoarthritis of left knee     Chondromalacia of left patella        Office Procedures:  Orders Placed This Encounter   Procedures    LATERAL STABILIZER     Patient was supplied a Breg Lateral Stabilizer. This retail item was supplied to provide functional support and assist in protecting the affected area. Verbal and written instructions for the use of and application of this item were provided. The patient was educated and fit by a healthcare professional with expert knowledge and specialization in brace application. They were instructed to contact the office immediately should the equipment result in increased pain, decreased sensation, increased swelling or worsening of the condition.  Suzette Benson MD (General)     Referral Priority:   Routine     Referral Type:   Consult for Advice and Opinion     Referred to Provider:   Rosana León MD     Requested Specialty:   Orthopedic Surgery     Number of Visits Requested:   1       Treatment Plan:  Treatment options were discussed with Geovani Bowens. We did review her MRI films and exam findings. Clinically at this point her most pressing issue is acute worsening of pain to her left knee and we did review the results of her MRI today.   She does have focal significant anterior compartment and to lesser

## 2018-01-26 NOTE — PROGRESS NOTES
Chief Complaint   Patient presents with    New Patient     Left Knee: Refer by Dr. Burt Hendricks for medial mensicus tear        HISTORY OF PRESENT ILLNESS    Sammie Lund is a 45 y.o. female. Presents for evaluation of Her left knee. I'm seeing her as a surgical consultation for Dr. Burt Hendricks. She sustained an injury on 1/22/2018 and what sounds like a hyperextension event of her knee. Her knee buckled on her at that time and since that time she's had extreme pain and medial portion of her knee and been using a walker. She went to the emergency department initially where x-rays were taken. She has previously seen Dr. Burt Hendricks and saw him in regards to this injury. An MRI was ordered which demonstrated an injury. She is here for evaluation as well as to discuss treatment options. She denies previous surgery on her knee. She has noticed slight clicking as well as her knee buckling and giving way. Onset: Acute    Activities/occupation: Works at [employer]    PAST MEDICAL/SURGICAL HISTORY     Past Medical History:   Diagnosis Date    Acute bronchitis 5/19/2010    Anxiety state, unspecified 5/19/2010    Chondromalacia of patella 5/19/2010    Degeneration of cervical intervertebral disc 5/19/2010    Diverticulitis of colon (without mention of hemorrhage)(562.11) 7/16/7183    Dysmetabolic syndrome X 6/02/4759    Excessive or frequent menstruation 5/19/2010    Insomnia, unspecified 5/19/2010    Leiomyoma of uterus, unspecified 5/19/2010    Lumbago 5/23/2010    Tachycardia        Past Surgical History:   Procedure Laterality Date    HYSTERECTOMY      SINUS SURGERY  2001       Social History     Social History    Marital status:      Spouse name: N/A    Number of children: N/A    Years of education: N/A     Occupational History    Not on file.      Social History Main Topics    Smoking status: Never Smoker    Smokeless tobacco: Never Used    Alcohol use No    Drug use: No    Sexual activity: Not on

## 2018-01-30 ENCOUNTER — TELEPHONE (OUTPATIENT)
Dept: ORTHOPEDIC SURGERY | Age: 53
End: 2018-01-30

## 2018-02-01 ENCOUNTER — HOSPITAL ENCOUNTER (OUTPATIENT)
Dept: PHYSICAL THERAPY | Age: 53
Discharge: HOME | End: 2018-02-28
Attending: ORTHOPAEDIC SURGERY | Admitting: ORTHOPAEDIC SURGERY

## 2018-02-08 ENCOUNTER — HOSPITAL ENCOUNTER (OUTPATIENT)
Dept: SURGERY | Age: 53
Discharge: HOME | End: 2018-02-08
Attending: ORTHOPAEDIC SURGERY | Admitting: ORTHOPAEDIC SURGERY

## 2018-02-08 VITALS
HEIGHT: 66 IN | OXYGEN SATURATION: 96 % | RESPIRATION RATE: 19 BRPM | DIASTOLIC BLOOD PRESSURE: 79 MMHG | WEIGHT: 242 LBS | HEART RATE: 99 BPM | TEMPERATURE: 98.3 F | BODY MASS INDEX: 38.89 KG/M2 | SYSTOLIC BLOOD PRESSURE: 161 MMHG

## 2018-02-08 DIAGNOSIS — Z98.890 S/P KNEE SURGERY: Primary | ICD-10-CM

## 2018-02-08 RX ORDER — OXYCODONE HYDROCHLORIDE AND ACETAMINOPHEN 5; 325 MG/1; MG/1
2 TABLET ORAL PRN
Status: ACTIVE | OUTPATIENT
Start: 2018-02-08 | End: 2018-02-08

## 2018-02-08 RX ORDER — SODIUM CHLORIDE 0.9 % (FLUSH) 0.9 %
10 SYRINGE (ML) INJECTION EVERY 12 HOURS SCHEDULED
Status: DISCONTINUED | OUTPATIENT
Start: 2018-02-08 | End: 2018-02-09 | Stop reason: HOSPADM

## 2018-02-08 RX ORDER — MORPHINE SULFATE 2 MG/ML
2 INJECTION, SOLUTION INTRAMUSCULAR; INTRAVENOUS EVERY 5 MIN PRN
Status: DISCONTINUED | OUTPATIENT
Start: 2018-02-08 | End: 2018-02-09 | Stop reason: HOSPADM

## 2018-02-08 RX ORDER — OXYCODONE HYDROCHLORIDE AND ACETAMINOPHEN 5; 325 MG/1; MG/1
1-2 TABLET ORAL EVERY 6 HOURS PRN
Qty: 56 TABLET | Refills: 0 | Status: SHIPPED | OUTPATIENT
Start: 2018-02-08 | End: 2018-02-13 | Stop reason: SDUPTHER

## 2018-02-08 RX ORDER — ONDANSETRON 4 MG/1
4 TABLET, FILM COATED ORAL EVERY 8 HOURS PRN
Qty: 30 TABLET | Refills: 0 | Status: SHIPPED | OUTPATIENT
Start: 2018-02-08 | End: 2018-02-19 | Stop reason: SDUPTHER

## 2018-02-08 RX ORDER — DOCUSATE SODIUM 100 MG/1
100 CAPSULE, LIQUID FILLED ORAL 2 TIMES DAILY PRN
Qty: 20 CAPSULE | Refills: 0 | Status: SHIPPED | OUTPATIENT
Start: 2018-02-08

## 2018-02-08 RX ORDER — MEPERIDINE HYDROCHLORIDE 50 MG/ML
12.5 INJECTION INTRAMUSCULAR; INTRAVENOUS; SUBCUTANEOUS EVERY 5 MIN PRN
Status: DISCONTINUED | OUTPATIENT
Start: 2018-02-08 | End: 2018-02-09 | Stop reason: HOSPADM

## 2018-02-08 RX ORDER — HYDRALAZINE HYDROCHLORIDE 20 MG/ML
5 INJECTION INTRAMUSCULAR; INTRAVENOUS
Status: DISCONTINUED | OUTPATIENT
Start: 2018-02-08 | End: 2018-02-09 | Stop reason: HOSPADM

## 2018-02-08 RX ORDER — SODIUM CHLORIDE, SODIUM LACTATE, POTASSIUM CHLORIDE, CALCIUM CHLORIDE 600; 310; 30; 20 MG/100ML; MG/100ML; MG/100ML; MG/100ML
INJECTION, SOLUTION INTRAVENOUS CONTINUOUS
Status: DISCONTINUED | OUTPATIENT
Start: 2018-02-08 | End: 2018-02-09 | Stop reason: HOSPADM

## 2018-02-08 RX ORDER — ACETAMINOPHEN 10 MG/ML
1000 INJECTION, SOLUTION INTRAVENOUS ONCE
Status: COMPLETED | OUTPATIENT
Start: 2018-02-08 | End: 2018-02-08

## 2018-02-08 RX ORDER — LIDOCAINE HYDROCHLORIDE 10 MG/ML
1 INJECTION, SOLUTION EPIDURAL; INFILTRATION; INTRACAUDAL; PERINEURAL
Status: ACTIVE | OUTPATIENT
Start: 2018-02-08 | End: 2018-02-08

## 2018-02-08 RX ORDER — ONDANSETRON 2 MG/ML
4 INJECTION INTRAMUSCULAR; INTRAVENOUS
Status: COMPLETED | OUTPATIENT
Start: 2018-02-08 | End: 2018-02-08

## 2018-02-08 RX ORDER — ASPIRIN 325 MG
325 TABLET, DELAYED RELEASE (ENTERIC COATED) ORAL 2 TIMES DAILY WITH MEALS
Qty: 60 TABLET | Refills: 0 | Status: SHIPPED | OUTPATIENT
Start: 2018-02-08 | End: 2018-02-19 | Stop reason: ALTCHOICE

## 2018-02-08 RX ORDER — LABETALOL HYDROCHLORIDE 5 MG/ML
5 INJECTION, SOLUTION INTRAVENOUS EVERY 10 MIN PRN
Status: DISCONTINUED | OUTPATIENT
Start: 2018-02-08 | End: 2018-02-09 | Stop reason: HOSPADM

## 2018-02-08 RX ORDER — OXYCODONE HYDROCHLORIDE AND ACETAMINOPHEN 5; 325 MG/1; MG/1
1 TABLET ORAL PRN
Status: ACTIVE | OUTPATIENT
Start: 2018-02-08 | End: 2018-02-08

## 2018-02-08 RX ORDER — MORPHINE SULFATE 2 MG/ML
1 INJECTION, SOLUTION INTRAMUSCULAR; INTRAVENOUS EVERY 5 MIN PRN
Status: DISCONTINUED | OUTPATIENT
Start: 2018-02-08 | End: 2018-02-09 | Stop reason: HOSPADM

## 2018-02-08 RX ORDER — SODIUM CHLORIDE 0.9 % (FLUSH) 0.9 %
10 SYRINGE (ML) INJECTION PRN
Status: DISCONTINUED | OUTPATIENT
Start: 2018-02-08 | End: 2018-02-09 | Stop reason: HOSPADM

## 2018-02-08 RX ADMIN — SODIUM CHLORIDE, SODIUM LACTATE, POTASSIUM CHLORIDE, CALCIUM CHLORIDE: 600; 310; 30; 20 INJECTION, SOLUTION INTRAVENOUS at 15:42

## 2018-02-08 RX ADMIN — Medication 0.5 MG: at 15:48

## 2018-02-08 RX ADMIN — Medication 0.25 MG: at 16:10

## 2018-02-08 RX ADMIN — ONDANSETRON 4 MG: 2 INJECTION INTRAMUSCULAR; INTRAVENOUS at 15:56

## 2018-02-08 RX ADMIN — Medication 0.25 MG: at 16:20

## 2018-02-08 RX ADMIN — SODIUM CHLORIDE, SODIUM LACTATE, POTASSIUM CHLORIDE, CALCIUM CHLORIDE: 600; 310; 30; 20 INJECTION, SOLUTION INTRAVENOUS at 11:18

## 2018-02-08 RX ADMIN — Medication 0.5 MG: at 15:39

## 2018-02-08 RX ADMIN — Medication 0.25 MG: at 15:25

## 2018-02-08 RX ADMIN — ACETAMINOPHEN 1000 MG: 10 INJECTION, SOLUTION INTRAVENOUS at 11:26

## 2018-02-08 RX ADMIN — Medication 0.25 MG: at 15:33

## 2018-02-08 ASSESSMENT — PAIN SCALES - GENERAL
PAINLEVEL_OUTOF10: 7
PAINLEVEL_OUTOF10: 7
PAINLEVEL_OUTOF10: 8
PAINLEVEL_OUTOF10: 6
PAINLEVEL_OUTOF10: 0
PAINLEVEL_OUTOF10: 8

## 2018-02-08 ASSESSMENT — PAIN - FUNCTIONAL ASSESSMENT: PAIN_FUNCTIONAL_ASSESSMENT: 0-10

## 2018-02-08 NOTE — OP NOTE
Operative Note    Diana Pettit  YOB: 1965   Date of Service: 2/8/2018  [id number]     Pre-operative Diagnosis: Left knee medial meniscus root tear, early osteoarthritis right knee     Post-operative Diagnosis: Same     Procedure:   1. Left knee arthroscopy, medial meniscus root repair  2. Left knee arthroscopy, chondroplasty trochlea, medial femoral condyle     Anesthesia: General     Surgeon: Mini Gutierrez MD     Estimated Blood Loss: less than 50      Complications: None     Antibiotics: 2 g Ancef IV prior to incision     Specimens: Was Not Obtained     Findings: right knee medial meniscus root tear, grade 2-3 changes medial femoral condyle, grade 2-3 changes trochlea      Indications for Operation  This is a 52-year-old female diagnosed with an acute left knee medial meniscus root tear. After a long discussion preoperatively discussing the long-term consequences as well as the prognosis they elected to go forward with surgery. We discussed surgical options including meniscal root repair vs partial meniscectomy and if possible she wished to go forward with root repair. After discussing the pros and cons of treatment options and risks and benefits of surgical intervention, She understands that there are no guarantee of results with surgery and there are always risks of infection, stiff joint, injury to nerve or blood vessel, blood clot, anesthesia complications, etc. The procedure and recovery were discussed and all questions answered. Risks and benefits of the procedure were fully explained in detail, including but not limited to infection, neurovascular injury, continued pain, arthritis, stiffness, need for further surgery, re-injury, DVT, PE, general risks of anesthesia and loss of limb or life.       Site Marking and Surgical Prep  The patient was seen in the holding area and the left lower extremity marked with a pen.   The patient was taken to the operative suite, identified, placed on out cartilage fissures completing a chondroplasty. Attention was then turned to the trochlea. There was unstable cartilage edges which were debrided with an arthroscopic shaver. A [employer]ans [employer] temperature controlled Werewolf ablator was then used to smooth out cartilage fissures completing a chondroplasty. Again the knee was diagnostically scoped a second and third time to ensure no remaining loose bodies were present. The knee was then copiously irrigated and drained of arthroscopy fluid. Closure  The portal sites were closed with 4-0 Monocryl. The fascia over the anterior compartment was repaired with an 0 vicryl and 2-0 vicryl was used for subcutaneous layer and 4-0 Monocryl for skin. Sterile dressings and an elastic bandage and knee immobilizer were placed. The patient was awakened and taken to the postoperative area in stable condition. The toes were pink and warm. All sponge and needle counts were correct.     Jerry Anderson MD  [address] [facility])

## 2018-02-08 NOTE — ANESTHESIA PRE-OP
Department of Anesthesiology  Preprocedure Note       Name:  Malini Trejo   Age:  45 y.o.  :  1965                                          MRN:  [id number]         Date:  2018      Surgeon:    Procedure:    Medications prior to admission:   Prior to Admission medications    Medication Sig Start Date End Date Taking? Authorizing Provider   RABEprazole (ACIPHEX) 20 MG tablet Take 20 mg by mouth daily  17  Yes Historical Provider, MD   methocarbamol (ROBAXIN) 750 MG tablet Take 750 mg by mouth as needed  17  Yes Historical Provider, MD   gabapentin (NEURONTIN) 300 MG capsule TAKE ONE CAPSULE BY MOUTH THREE TIMES A DAY 17  Yes Historical Provider, MD   naproxen (NAPROSYN) 500 MG tablet Take 1 tablet by mouth 2 times daily (with meals) 10/18/17  Yes Felicia Sauer MD   THEANINE PO Take by mouth as needed    Yes Historical Provider, MD   meperidine (DEMEROL) 50 MG tablet Take 1 tablet by mouth nightly as needed for Pain. 1/26/15  Yes Talita Cotton MD   nadolol (CORGARD) 20 MG tablet TAKE ONE TABLET BY MOUTH TWICE A DAY 10/6/14  Yes Talita Cotton MD   zolpidem (AMBIEN) 10 MG tablet Take 10 mg by mouth nightly as needed . 17   Historical Provider, MD   clonazePAM (KLONOPIN) 0.5 MG tablet Take 0.5 mg by mouth as needed . Historical Provider, MD   CHERATUSSIN -10 MG/5ML syrup as needed . 17   Historical Provider, MD   benzoyl peroxide-erythromycin (BENZAMYCIN) 5-3 % gel Apply topically two times a day    Historical Provider, MD   triamterene-hydrochlorothiazide (MAXZIDE-25) 37.5-25 MG per tablet TAKE ONE-HALF TABLET BY MOUTH DAILY 3/6/17   Historical Provider, MD   benzoyl peroxide-erythromycin (BENZAMYCIN) 5-3 % gel APPLY TOPICALLY TWO TIMES A DAY 2/17/15   Talita Cotton MD   SUMAtriptan (IMITREX) 50 MG tablet Take 1 tablet by mouth once as needed for Migraine for 1 dose.  May repeat in 2 hours if needed 14   Talita Cotton MD   Pseudoephedrine-Ibuprofen  MG TABS Take 1 capsule by mouth 4 times daily as needed. 1/29/14   Kenny Fowler MD       Current medications:    Current Outpatient Prescriptions   Medication Sig Dispense Refill    RABEprazole (ACIPHEX) 20 MG tablet Take 20 mg by mouth daily       methocarbamol (ROBAXIN) 750 MG tablet Take 750 mg by mouth as needed       gabapentin (NEURONTIN) 300 MG capsule TAKE ONE CAPSULE BY MOUTH THREE TIMES A DAY      naproxen (NAPROSYN) 500 MG tablet Take 1 tablet by mouth 2 times daily (with meals) 60 tablet 3    THEANINE PO Take by mouth as needed       meperidine (DEMEROL) 50 MG tablet Take 1 tablet by mouth nightly as needed for Pain. 30 tablet 0    nadolol (CORGARD) 20 MG tablet TAKE ONE TABLET BY MOUTH TWICE A DAY 60 tablet 5    zolpidem (AMBIEN) 10 MG tablet Take 10 mg by mouth nightly as needed .  clonazePAM (KLONOPIN) 0.5 MG tablet Take 0.5 mg by mouth as needed .  CHERATUSSIN -10 MG/5ML syrup as needed .  benzoyl peroxide-erythromycin (BENZAMYCIN) 5-3 % gel Apply topically two times a day      triamterene-hydrochlorothiazide (MAXZIDE-25) 37.5-25 MG per tablet TAKE ONE-HALF TABLET BY MOUTH DAILY      benzoyl peroxide-erythromycin (BENZAMYCIN) 5-3 % gel APPLY TOPICALLY TWO TIMES A DAY 46.6 g 1    SUMAtriptan (IMITREX) 50 MG tablet Take 1 tablet by mouth once as needed for Migraine for 1 dose. May repeat in 2 hours if needed 18 tablet 0    Pseudoephedrine-Ibuprofen  MG TABS Take 1 capsule by mouth 4 times daily as needed.  24 tablet 0     Current Facility-Administered Medications   Medication Dose Route Frequency Provider Last Rate Last Dose    lactated ringers infusion   Intravenous Continuous Markus Osborne  mL/hr at 02/08/18 1118      sodium chloride flush 0.9 % injection 10 mL  10 mL Intravenous 2 times per day Markus Osborne MD        sodium chloride flush 0.9 % injection 10 mL  10 mL Intravenous PRN Markus Osborne MD        lidocaine PF 1 % injection 1 mL  1 mL Intradermal intervertebral disc 5/19/2010    Diverticulitis of colon (without mention of hemorrhage)(562.11) 7/24/6523    Dysmetabolic syndrome X 3/11/2624    Excessive or frequent menstruation 5/19/2010    Insomnia, unspecified 5/19/2010    Leiomyoma of uterus, unspecified 5/19/2010    Lumbago 5/23/2010    Tachycardia        Past Surgical History:        Procedure Laterality Date    COLECTOMY      X 2 perforated colon from diverticulitis and then blockage in colon    HYSTERECTOMY      SINUS SURGERY  2001       Social History:    Social History   Substance Use Topics    Smoking status: Never Smoker    Smokeless tobacco: Never Used    Alcohol use No                                Counseling given: Not Answered      Vital Signs (Current):   Vitals:    02/08/18 1126   BP: 123/77   Pulse: 83   Resp: 14   Temp: 99 °F (37.2 °C)   TempSrc: Temporal   SpO2: 95%   Weight: 242 lb (109.8 kg)   Height: 5' 6\" (1.676 m)                                              BP Readings from Last 3 Encounters:   02/08/18 123/77   01/26/18 126/80   01/26/18 108/61       NPO Status: Time of last liquid consumption: 2330                        Time of last solid consumption: 2330                        Date of last liquid consumption: 02/07/18                        Date of last solid food consumption: 02/07/18    BMI:   Wt Readings from Last 3 Encounters:   02/08/18 242 lb (109.8 kg)   02/07/18 242 lb (109.8 kg)   01/26/18 235 lb 0.2 oz (106.6 kg)     Body mass index is 39.06 kg/m².     Anesthesia Evaluation    Airway: Mallampati: III  TM distance: >3 FB   Neck ROM: full  Mouth opening: > = 3 FB Dental:          Pulmonary:                              Cardiovascular:                      Neuro/Psych:   (+) neuromuscular disease:, headaches:, psychiatric history:            GI/Hepatic/Renal:   (+) GERD:,           Endo/Other:                     Abdominal:           Vascular:                                        Anesthesia Plan      general

## 2018-02-08 NOTE — PROGRESS NOTES
Discharge instructions reviewed with patient/responsible adult and understanding verbalized. Discharge instructions signed and copies given. Patient discharged home with belongings. , Mom and sister with pt. Rx has been e scripted to her pharmacy. Medication information side effect sheet given to patient.

## 2018-02-09 DIAGNOSIS — M17.12 PRIMARY LOCALIZED OSTEOARTHROSIS OF LEFT LOWER LEG: ICD-10-CM

## 2018-02-09 DIAGNOSIS — S83.232D COMPLEX TEAR OF MEDIAL MENISCUS OF LEFT KNEE AS CURRENT INJURY, SUBSEQUENT ENCOUNTER: Primary | ICD-10-CM

## 2018-02-12 ENCOUNTER — HOSPITAL ENCOUNTER (OUTPATIENT)
Dept: PHYSICAL THERAPY | Facility: MEDICAL CENTER | Age: 53
Discharge: HOME | End: 2018-02-28
Attending: ORTHOPAEDIC SURGERY | Admitting: ORTHOPAEDIC SURGERY

## 2018-02-12 ENCOUNTER — HOSPITAL ENCOUNTER (OUTPATIENT)
Dept: PHYSICAL THERAPY | Age: 53
Discharge: HOME | End: 2018-01-31
Admitting: ORTHOPAEDIC SURGERY

## 2018-02-12 NOTE — PLAN OF CARE
than 100 percent impaired, limited or [id number]    Pain Scale: 9/10  Easing factors: meds  Provocative factors:     Type: [x]Constant   []Intermittent  []Radiating []Localized []other:     Numbness/Tingling:     Occupation/School:  Saleswoman at [employer]     Living Status/Prior Level of Function: Independent with ADLs and IADLs, has multiple steps to enter residence. OBJECTIVE:     pROM LEFT RIGHT   Knee flexion 10 140   Knee ext  -3 0                                                Strength  LEFT RIGHT   HIP Flexors n/a    HIP Abductors n/a    HIP Ext n/a    Hip ER n/a    Knee EXT (quad)     Knee Flex (HS)     Ankle DF     Ankle PF     Ankle Inv     Ankle EV          Circumference  Mid apex  7 cm prox     1,1, 2 cm asymmetry between L and R          Reflexes/Sensation:    []Dermatomes/Myotomes intact    []Reflexes equal and normal bilaterally   []Other:    Joint mobility: n/A   []Normal    []Hypo   []Hyper    Palpation: SEVERE global   Functional Mobility/Transfers:     Posture:     Bandages/Dressings/Incisions:     Gait: nwb - mod assist     Orthopedic Special Tests:       N/a           [x] Patient history, allergies, meds reviewed. Medical chart reviewed. See intake form. Review Of Systems (ROS):  [x]Performed Review of systems (Integumentary, CardioPulmonary, Neurological) by intake and observation. Intake form has been scanned into medical record. Patient has been instructed to contact their primary care physician regarding ROS issues if not already being addressed at this time.       Co-morbidities/Complexities (which will affect course of rehabilitation):   [x]None           Arthritic conditions   []Rheumatoid arthritis (M05.9)  []Osteoarthritis (M19.91)   Cardiovascular conditions   []Hypertension (I10)  []Hyperlipidemia (E78.5)  []Angina pectoris (I20)  []Atherosclerosis (I70)   Musculoskeletal conditions   []Disc pathology   []Congenital spine pathologies   []Prior surgical of body systems using standardized tests and measures addressing any of the following: body structures and functions (impairments), activity limitations, and/or participation restrictions;:  [] a total of 1-2 or more elements   [] a total of 3 or more elements   [] a total of 4 or more elements   [x] A clinical presentation with:  [] stable and/or uncomplicated characteristics   [] evolving clinical presentation with changing characteristics  [] unstable and unpredictable characteristics;   [x] Clinical decision making of [] low, [] moderate, [] high complexity using standardized patient assessment instrument and/or measurable assessment of functional outcome. [x] EVAL (LOW) 87039 (typically 20 minutes face-to-face)  [] EVAL (MOD) 33570 (typically 30 minutes face-to-face)  [] EVAL (HIGH) 18070 (typically 45 minutes face-to-face)  [] RE-EVAL       PLAN:   Frequency/Duration:  2 days per week for 8 Weeks:  Interventions:  [x]  Therapeutic exercise including: strength training, ROM, for Lower extremity and core   [x]  NMR activation and proprioception for LE, Glutes and Core   [x]  Manual therapy as indicated for LE, Hip and spine to include: Dry Needling/IASTM, STM, PROM, Gr I-IV mobilizations, manipulation. [x] Modalities as needed that may include: thermal agents, E-stim, Biofeedback, US, iontophoresis as indicated  [x] Patient education on joint protection, postural re-education, activity modification, progression of HEP. HEP instruction:  Belt hamstring stretch, quad set, self heel slide with hands     GOALS:  Patient stated goal: \"be normal again. [place] \"       Therapist goals for Patient:   Short Term Goals: To be achieved in: 2 weeks  1. Independent in HEP and progression per patient tolerance, in order to prevent re-injury. 2. Patient will have a decrease in pain to facilitate improvement in movement, function, and ADLs as indicated by Functional Deficits. Long Term Goals:  To be achieved in: 12 weeks  1. Disability index score of  60% or less for the LEFS to assist with reaching prior level of function. 2. Patient will demonstrate increased AROM to 130 degrees to allow for proper joint functioning as indicated by patients Functional Deficits. 3. Patient will demonstrate an increase in Strength to 4+/5 knee to allow for proper functional mobility as indicated by patients Functional Deficits. 4. Patient will return to  functional activities without increased symptoms or restriction.    5.      Electronically signed by:  Yodit Hicks PT, MPT

## 2018-02-13 DIAGNOSIS — Z98.890 S/P KNEE SURGERY: ICD-10-CM

## 2018-02-13 RX ORDER — OXYCODONE HYDROCHLORIDE AND ACETAMINOPHEN 5; 325 MG/1; MG/1
1-2 TABLET ORAL EVERY 6 HOURS PRN
Qty: 56 TABLET | Refills: 0 | Status: SHIPPED | OUTPATIENT
Start: 2018-02-13 | End: 2018-02-20 | Stop reason: SDUPTHER

## 2018-02-13 RX ORDER — OXYCODONE HYDROCHLORIDE AND ACETAMINOPHEN 5; 325 MG/1; MG/1
1-2 TABLET ORAL EVERY 6 HOURS PRN
Qty: 56 TABLET | Refills: 0 | Status: SHIPPED | OUTPATIENT
Start: 2018-02-13 | End: 2018-02-13 | Stop reason: SDUPTHER

## 2018-02-13 NOTE — FLOWSHEET NOTE
Charges:  Timed Code Treatment Minutes: 43'    Total Treatment Minutes: 76'      [] EVAL  [] QG(67047) x      [] IONTO  [] NMR (76069) x      [] VASO  [] Manual ([id number]) x       [] Other:  [] TA x       [] Mech Traction (44969)  [] ES(attended) (78569)      [] ES (un) (25502):     GOALS:     Progression Towards Functional goals:  [] Patient is progressing as expected towards functional goals listed. [] Progression is slowed due to complexities listed. [] Progression has been slowed due to co-morbidities.   [x] Plan just implemented, too soon to assess goals progression  [] Other:     ASSESSMENT:  See eval    Treatment/Activity Tolerance:  [] Patient tolerated treatment well [] Patient limited by fatique  [] Patient limited by pain  [] Patient limited by other medical complications  [] Other:     Prognosis: [] Good [] Fair  [] Poor    Patient Requires Follow-up: [x] Yes  [] No    PLAN: See eval  [] Continue per plan of care [] Alter current plan (see comments)  [x] Plan of care initiated [] Hold pending MD visit [] Discharge    Electronically signed by: Yandel Schultz PT, MPT

## 2018-02-15 ENCOUNTER — TELEPHONE (OUTPATIENT)
Dept: ORTHOPEDIC SURGERY | Age: 53
End: 2018-02-15

## 2018-02-15 NOTE — TELEPHONE ENCOUNTER
Received a call from patients mother stating Cuco Evans is still having excruciating pain, states has been running a fever between 100.7-100.2 over the past few days, has questions regarding the brace (does she have to wear it at all times) and is the Ace Wrap necessary to wear. I spoke with Dr. Mary Red and advised patients mother, Aure Jajarosalie, the temperatures are due to patient not breathing properly because she is laying in bed all day and not being up and mobile, advised mother that patient needs to sit up and take deep breaths; I advised her that the ace wrap is not necessary to wear as long she bandaids are being put over the incisions; regarding the brace she was advised that Cuco Evans does not need to wear it at all times due to she should be working on bending her knee, her mother states she will advise her of all of this but states she can not get out of bed due to she can not use crutches and it hurts too bad to use the walker, I explained the importance of getting up. Cuco Evans, mother, also states that patient is requiring her pain medication every 5 hours, per Dr. Mary Red I reminded her that on Tuesday, 2/13/18, it was stressed when medication was refilled that patient is to follow the prescription directions of 1-2 q 6 hours, the mother states that is impossible, I did advise that patient is able to stop the ASA and bridge the gap with taking Ibuprofen or Advil, it was stressed that if patient discontinues the ASA that she is at a higher risk of Blood Clots and the importance of getting up and also doing ankle pumps, but that she is to only take the pain medication every 6 hours. Cuco Evans, mother, states she understands all of this and will relay to patient. ---DMD

## 2018-02-19 ENCOUNTER — OFFICE VISIT (OUTPATIENT)
Dept: ORTHOPEDIC SURGERY | Age: 53
End: 2018-02-19

## 2018-02-19 VITALS
WEIGHT: 242.06 LBS | HEART RATE: 68 BPM | SYSTOLIC BLOOD PRESSURE: 137 MMHG | DIASTOLIC BLOOD PRESSURE: 84 MMHG | HEIGHT: 66 IN | BODY MASS INDEX: 38.9 KG/M2

## 2018-02-19 DIAGNOSIS — Z98.890 S/P LEFT KNEE ARTHROSCOPY: Primary | ICD-10-CM

## 2018-02-19 PROCEDURE — 99024 POSTOP FOLLOW-UP VISIT: CPT | Performed by: ORTHOPAEDIC SURGERY

## 2018-02-19 RX ORDER — ONDANSETRON 4 MG/1
4 TABLET, FILM COATED ORAL EVERY 8 HOURS PRN
Qty: 30 TABLET | Refills: 0 | Status: SHIPPED | OUTPATIENT
Start: 2018-02-19 | End: 2018-03-12 | Stop reason: SDUPTHER

## 2018-02-19 RX ORDER — MELOXICAM 15 MG/1
15 TABLET ORAL DAILY
Qty: 30 TABLET | Refills: 1 | Status: SHIPPED | OUTPATIENT
Start: 2018-02-19 | End: 2018-03-19 | Stop reason: SDUPTHER

## 2018-02-19 NOTE — PROGRESS NOTES
Chief Complaint  Post-Op Check (ck left knee sx: 3/8)      History of Present Illness:  Joseph Nelson is a 45 y.o. y/o female who presents post op status post left knee arthroscopy with medial meniscus root repair and chondroplasty medial femoral condyle and trochlea on 2/8/2018. She has been doing okay. She has had a lot of pain. She has remained nonweightbearing. She went to physical therapy one time. She has been taking her aspirin. She denies any new injuries. She states she feels like she has sharp shooting pain in her knee at times and it moves around. She denies any fevers, chills, night sweats, nausea, vomiting. She denies excessive numbness, tingling, calf pain, chest pain, shortness of breath. She denies erythema, warmth, excessive drainage from the surgical site. Vital Signs  Vitals:    02/19/18 0932   BP: 137/84   Pulse: 68       General Exam:   Constitutional: Patient is adequately groomed with no evidence of malnutrition  Mental Status: The patient is oriented to time, place and person. The patient's mood and affect are appropriate. Lymphatic: The lymphatic examination bilaterally reveals all areas to be without enlargement or induration. Neurological: The patient has good coordination. There is no weakness or sensory deficit. Gait: Nonweightbearing    Left knee  Examination  Inspection:  Incisions clean, dry, and in tact without erythema, warmth, purulent drainage, or other signs of infection. Palpation:  Mild swelling and tenderness near the operative site    Range of Motion:  0-40    Sensation: In tact to light touch all nerve distributions     Strength:  Normal motor exam limited secondary to pain    Special Tests:  None performed    Skin: There are no additional worrisome rashes, ulcerations or lesions. Circulation normal    Additional Examinations:  Right Lower Extremity: Examination of the right lower extremity does not show any tenderness, deformity or injury.   Range of motion is unremarkable. There is no gross instability. There are no rashes, ulcerations or lesions. Strength and tone are normal.      Radiology:     X-rays obtained and reviewed in office:  None      Assessment:  47-year-old female 11 days status post left knee arthroscopy with medial meniscus repair and chondroplasty medial femoral condyle and trochlea    Office Procedures:  No orders of the defined types were placed in this encounter. Plan:   -We will continue with physical therapy and postoperative protocol as planned. She will remain nonweightbearing for a total of 4-6 weeks postoperative and she would like to elect for the full 6 weeks  -We discussed that she should work on range of motion  -Her physical therapist does have a copy of the postop recall and is aware of her restrictions as well as goals for working on range of motion. We discussed the risks of arthrofibrosis if she does not move her knee  -We will stop her aspirin at this point in start her on anti-inflammatory medication meloxicam for pain relief in addition to the postop narcotics which she is still taking  The patient was advised that NSAID-type medications have two very important potential side effects: gastrointestinal irritation including hemorrhage and renal injuries. They were asked to take the medication with food and to stop if they experience any GI upset. They were instructed to call for vomiting, abdominal pain or black/bloody stools. Renal function testing should be provided per primary care provider periodically.   The patient expresses understanding of these issues and questions were answered.  -I will also refill her Zofran  -I will see her back in 4 weeks for repeat evaluation at that time we will be able to progress with physical therapy and weightbearing  -If there are issues in the interim she should contact the office      Voice Recognition Dictation disclaimer: Please note that portions of this chart were generated

## 2018-02-20 ENCOUNTER — HOSPITAL ENCOUNTER (OUTPATIENT)
Dept: PHYSICAL THERAPY | Facility: MEDICAL CENTER | Age: 53
Discharge: HOME | End: 2018-02-21
Admitting: ORTHOPAEDIC SURGERY

## 2018-02-20 DIAGNOSIS — Z98.890 S/P LEFT KNEE ARTHROSCOPY: Primary | ICD-10-CM

## 2018-02-20 DIAGNOSIS — Z98.890 S/P KNEE SURGERY: ICD-10-CM

## 2018-02-20 RX ORDER — OXYCODONE HYDROCHLORIDE AND ACETAMINOPHEN 5; 325 MG/1; MG/1
1-2 TABLET ORAL EVERY 6 HOURS PRN
Qty: 56 TABLET | Refills: 0 | Status: SHIPPED | OUTPATIENT
Start: 2018-02-20 | End: 2018-02-27

## 2018-02-27 ENCOUNTER — HOSPITAL ENCOUNTER (OUTPATIENT)
Dept: PHYSICAL THERAPY | Facility: MEDICAL CENTER | Age: 53
Discharge: HOME | End: 2018-02-28
Admitting: ORTHOPAEDIC SURGERY

## 2018-02-27 NOTE — FLOWSHEET NOTE
[facility]  Orthopaedics and Sports Rehabilitation, [facility]    Physical Therapy Daily Treatment Note  Date:  2018    Patient Name:  Jewels Tavarez    :  1965  MRN: [id number]  Restrictions/Precautions:    Medical/Treatment Diagnosis Information:    S83.232D (ICD-10-CM) - Complex tear of medial meniscus of left knee as current injury, subsequent encounter (s/p )  · M25.562 left knee pain      Insurance/Certification information:     Physician Information:  Referring Practitioner: dr early   Plan of care signed (Y/N):     Date of Patient follow up with Physician:     G-Code (if applicable):      Date G-Code Applied:         Progress Note: [x]  Yes  []  No  Next due by: Visit #10       Latex Allergy:  [x]NO      []YES  Preferred Language for Healthcare:   [x]English       []other:    Visit # Insurance Allowable   3 Pending      Pain level:  3-7/10     SUBJECTIVE:  \"doing a lot better\"     OBJECTIVE:  5 degrees hyperext to 90 degrees with mild pain; 3/5 knee strength   Observation:   Test measurements:      RESTRICTIONS/PRECAUTIONS:     Exercises/Interventions:     Therapeutic Ex Sets/sec Reps Notes   Seated hamstring stretch   5 x 20\"    Quad sets   Re-ed: 15'     Seated self knee flexion stretch   10 x 20\"     Ext stretch   8' - 3#          slr's (flex)   40x with assistance    laq   30x    saq  30x                                                    Manual Intervention      Prom eob   20'                                                                                                   Therapeutic Exercise and NMR EXR  [x] (75069) Provided verbal/tactile cueing for activities related to strengthening, flexibility, endurance, ROM for improvements in LE, proximal hip, and core control with self care, mobility, lifting, ambulation.  [] (19727) Provided verbal/tactile cueing for activities related to improving balance, coordination, kinesthetic sense, posture, motor skill, proprioception  to Progression Towards Functional goals:  [] Patient is progressing as expected towards functional goals listed. [] Progression is slowed due to complexities listed. [] Progression has been slowed due to co-morbidities.   [x] Plan just implemented, too soon to assess goals progression  [] Other:     ASSESSMENT:      Treatment/Activity Tolerance:  [] Patient tolerated treatment well [] Patient limited by fatique  [] Patient limited by pain  [] Patient limited by other medical complications  [] Other:     Prognosis: [] Good [] Fair  [] Poor    Patient Requires Follow-up: [x] Yes  [] No    PLAN:   [x] Continue per plan of care [] Alter current plan (see comments)  [] Plan of care initiated [] Hold pending MD visit [] Discharge    Electronically signed by: Lissa Russell PT, MPT

## 2018-03-01 ENCOUNTER — HOSPITAL ENCOUNTER (OUTPATIENT)
Dept: OTHER | Age: 53
Discharge: HOME | End: 2018-03-31
Attending: ORTHOPAEDIC SURGERY | Admitting: ORTHOPAEDIC SURGERY

## 2018-03-06 ENCOUNTER — HOSPITAL ENCOUNTER (OUTPATIENT)
Dept: PHYSICAL THERAPY | Facility: MEDICAL CENTER | Age: 53
Discharge: HOME | End: 2018-03-07
Admitting: ORTHOPAEDIC SURGERY

## 2018-03-06 DIAGNOSIS — Z98.890 S/P ARTHROSCOPIC KNEE SURGERY: Primary | ICD-10-CM

## 2018-03-06 RX ORDER — OXYCODONE HYDROCHLORIDE AND ACETAMINOPHEN 5; 325 MG/1; MG/1
1 TABLET ORAL EVERY 6 HOURS PRN
Qty: 28 TABLET | Refills: 0 | Status: SHIPPED | OUTPATIENT
Start: 2018-03-06 | End: 2018-03-13

## 2018-03-06 NOTE — TELEPHONE ENCOUNTER
Post Op medication, Percocet 5/325 mg, #28, 1 q 6 hrs, refilled by Dr. William Kirkpatrick due to S/P left knee surgery, during Dr. Yamel Harvey absence. ---DMD

## 2018-03-08 NOTE — FLOWSHEET NOTE
ES(attended) (52170)      [] ES (un) (97974):     GOALS:     Progression Towards Functional goals:  [x] Patient is progressing as expected towards functional goals listed. [] Progression is slowed due to complexities listed. [] Progression has been slowed due to co-morbidities.   [] Plan just implemented, too soon to assess goals progression  [] Other:     ASSESSMENT:      Treatment/Activity Tolerance:  [] Patient tolerated treatment well [] Patient limited by fatique  [] Patient limited by pain  [] Patient limited by other medical complications  [] Other:     Prognosis: [] Good [] Fair  [] Poor    Patient Requires Follow-up: [x] Yes  [] No    PLAN:   [x] Continue per plan of care [] Alter current plan (see comments)  [] Plan of care initiated [] Hold pending MD visit [] Discharge    Electronically signed by: Valentino Arguello PT, MPT

## 2018-03-15 ENCOUNTER — HOSPITAL ENCOUNTER (OUTPATIENT)
Dept: PHYSICAL THERAPY | Facility: MEDICAL CENTER | Age: 53
Discharge: HOME | End: 2018-03-16
Admitting: ORTHOPAEDIC SURGERY

## 2018-03-18 NOTE — FLOWSHEET NOTE
ES (un) (61047):     GOALS:     Progression Towards Functional goals:  [x] Patient is progressing as expected towards functional goals listed. [] Progression is slowed due to complexities listed. [] Progression has been slowed due to co-morbidities.   [] Plan just implemented, too soon to assess goals progression  [] Other:     ASSESSMENT:      Treatment/Activity Tolerance:  [] Patient tolerated treatment well [] Patient limited by fatique  [] Patient limited by pain  [] Patient limited by other medical complications  [] Other:     Prognosis: [] Good [] Fair  [] Poor    Patient Requires Follow-up: [x] Yes  [] No    PLAN:   [x] Continue per plan of care [] Alter current plan (see comments)  [] Plan of care initiated [] Hold pending MD visit [] Discharge    Electronically signed by: Tayler Casiano PT, MPT

## 2018-03-19 RX ORDER — ONDANSETRON 4 MG/1
TABLET, FILM COATED ORAL
Qty: 30 TABLET | Refills: 0 | Status: SHIPPED | OUTPATIENT
Start: 2018-03-19 | End: 2018-03-19 | Stop reason: SDUPTHER

## 2018-03-19 RX ORDER — ONDANSETRON 4 MG/1
4 TABLET, FILM COATED ORAL EVERY 8 HOURS PRN
Qty: 30 TABLET | Refills: 2 | Status: SHIPPED | OUTPATIENT
Start: 2018-03-19

## 2018-03-19 RX ORDER — MELOXICAM 15 MG/1
15 TABLET ORAL DAILY
Qty: 30 TABLET | Refills: 2 | Status: SHIPPED | OUTPATIENT
Start: 2018-03-19 | End: 2018-04-30 | Stop reason: SDUPTHER

## 2018-03-20 ENCOUNTER — HOSPITAL ENCOUNTER (OUTPATIENT)
Dept: PHYSICAL THERAPY | Facility: MEDICAL CENTER | Age: 53
Discharge: HOME | End: 2018-03-21
Admitting: ORTHOPAEDIC SURGERY

## 2018-03-22 ENCOUNTER — OFFICE VISIT (OUTPATIENT)
Dept: ORTHOPEDIC SURGERY | Age: 53
End: 2018-03-22

## 2018-03-22 VITALS — HEIGHT: 66 IN | BODY MASS INDEX: 38.9 KG/M2 | WEIGHT: 242.06 LBS

## 2018-03-22 DIAGNOSIS — Z98.890 S/P KNEE SURGERY: Primary | ICD-10-CM

## 2018-03-22 PROCEDURE — 99024 POSTOP FOLLOW-UP VISIT: CPT | Performed by: ORTHOPAEDIC SURGERY

## 2018-03-22 RX ORDER — OXYCODONE HYDROCHLORIDE AND ACETAMINOPHEN 5; 325 MG/1; MG/1
1 TABLET ORAL EVERY 6 HOURS PRN
Qty: 28 TABLET | Refills: 0 | Status: SHIPPED | OUTPATIENT
Start: 2018-03-22 | End: 2018-03-29

## 2018-03-22 RX ORDER — OXYCODONE HYDROCHLORIDE AND ACETAMINOPHEN 5; 325 MG/1; MG/1
1 TABLET ORAL EVERY 6 HOURS PRN
Qty: 28 TABLET | Refills: 0 | Status: SHIPPED | OUTPATIENT
Start: 2018-03-22 | End: 2018-03-22 | Stop reason: DRUGHIGH

## 2018-03-22 NOTE — FLOWSHEET NOTE
Provided verbal/tactile cueing for activities related to improving balance, coordination, kinesthetic sense, posture, motor skill, proprioception  to assist with LE, proximal hip, and core control in self care, mobility, lifting, ambulation and eccentric single leg control. NMR and Therapeutic Activities:    [x] (46851 or 42345) Provided verbal/tactile cueing for activities related to improving balance, coordination, kinesthetic sense, posture, motor skill, proprioception and motor activation to allow for proper function of core, proximal hip and LE with self care and ADLs  [] (78485) Gait Re-education- Provided training and instruction to the patient for proper LE, core and proximal hip recruitment and positioning and eccentric body weight control with ambulation re-education including up and down stairs     Home Exercise Program:    [x] (41323) Reviewed/Progressed HEP activities related to strengthening, flexibility, endurance, ROM of core, proximal hip and LE for functional self-care, mobility, lifting and ambulation/stair navigation   [] (70006)Reviewed/Progressed HEP activities related to improving balance, coordination, kinesthetic sense, posture, motor skill, proprioception of core, proximal hip and LE for self care, mobility, lifting, and ambulation/stair navigation      Manual Treatments:  PROM / STM / Oscillations-Mobs:  G-I, II, III, IV (PA's, Inf., Post.)  [x] (03335) Provided manual therapy to mobilize LE, proximal hip and/or LS spine soft tissue/joints for the purpose of modulating pain, promoting relaxation,  increasing ROM, reducing/eliminating soft tissue swelling/inflammation/restriction, improving soft tissue extensibility and allowing for proper ROM for normal function with self care, mobility, lifting and ambulation.      Modalities:    Cp   Charges:  Timed Code Treatment Minutes: 72'    Total Treatment Minutes: 90'      [] EVAL  [x] UX(21150) x  2   [] IONTO  [] NMR (44512) x  1   []

## 2018-03-23 ENCOUNTER — HOSPITAL ENCOUNTER (OUTPATIENT)
Dept: PHYSICAL THERAPY | Facility: MEDICAL CENTER | Age: 53
Discharge: HOME | End: 2018-03-24
Admitting: ORTHOPAEDIC SURGERY

## 2018-03-27 ENCOUNTER — HOSPITAL ENCOUNTER (OUTPATIENT)
Dept: PHYSICAL THERAPY | Facility: MEDICAL CENTER | Age: 53
Discharge: HOME | End: 2018-03-28
Admitting: ORTHOPAEDIC SURGERY

## 2018-03-29 ENCOUNTER — HOSPITAL ENCOUNTER (OUTPATIENT)
Dept: PHYSICAL THERAPY | Facility: MEDICAL CENTER | Age: 53
Discharge: HOME | End: 2018-03-30
Admitting: ORTHOPAEDIC SURGERY

## 2018-04-01 ENCOUNTER — HOSPITAL ENCOUNTER (OUTPATIENT)
Dept: OTHER | Age: 53
Discharge: HOME | End: 2018-04-30
Attending: ORTHOPAEDIC SURGERY | Admitting: ORTHOPAEDIC SURGERY

## 2018-04-03 ENCOUNTER — HOSPITAL ENCOUNTER (OUTPATIENT)
Dept: PHYSICAL THERAPY | Facility: MEDICAL CENTER | Age: 53
Discharge: HOME | End: 2018-04-04
Admitting: ORTHOPAEDIC SURGERY

## 2018-04-05 ENCOUNTER — HOSPITAL ENCOUNTER (OUTPATIENT)
Dept: PHYSICAL THERAPY | Facility: MEDICAL CENTER | Age: 53
Discharge: HOME | End: 2018-04-06
Admitting: ORTHOPAEDIC SURGERY

## 2018-04-08 NOTE — PROGRESS NOTES
[facility]  Orthopaedics and Sports Rehabilitation, [address] [place] [address], [address], [address]  Phone: [phone]   Fax:     [phone]                                                      Physical Therapy Re-Certification Plan of Care    Dear early  ,    We had the pleasure of treating the following patient for physical therapy services at [address] [place] [address]. A summary of our findings can be found in the updated assessment below. This includes our plan of care. If you have any questions or concerns regarding these findings, please do not hesitate to contact me at the office phone number checked above. Thank you for the referral.       Physician Signature:_______________________________Date:__________________  By signing above (or electronic signature), therapists plan is approved by physician      Patient: Reggie Herrmann   : 1965   MRN: [id number]  Referring Physician:        Evaluation Date: 2018      Medical Diagnosis Information:    J82.791C (ICD-10-CM) - Complex tear of medial meniscus of left knee as current injury, subsequent encounter (s/p )  M25.562 left knee pain                                              Insurance information:   [address] [place] [address] 18/ DED 3000 MET/ OOP 4350/ 80% 20%/ 20 VPCY/ NO AUTH REQ/ 18 ANDREW REF# [id number]/TH/ NEW 2018 PT BENEFITS ACTIVE 3/1/18 CUSTOM DESIGN/ $30 COPAY/ OOP 4350/ 61 COMB VPCY/ AUTH REQ/ 72 MISTI REF# [id number]/     Date Range:  -   Total visits: 10      G-Codes: (if applicable) PT G-Codes  Functional Assessment Tool Used: lefs  Score: 47%  Functional Limitation: Mobility: Walking and moving around  Mobility: Walking and Moving Around Current Status (): At least 40 percent but less than 60 percent impaired, limited or restricted  Mobility: Walking and Moving Around Goal Status ():  At least 20 percent but less than 40 percent impaired, limited or handling objects.  - Decreased LE ROM- Reduced overall functional mobility  - Decreased LE functional strength and neuromuscular control- Reduced overall functional mobility   - Pain/Difficulty with prolonged standing- Reduced overall functional mobility   - Pain/difficulty with transfers/bed mobility- Reduced overall functional mobility  - Reduced balance/proprioceptive control- Reduced overall functional level with mobility and gait  - Pain/difficulty with ambulation or prolonged ambulation- Reduced overall functional mobility  - Unable to perform self care tasks due to pain and dysfunction- Reduced ADL status  - Pain/difficulty with household work- Reduced ADL status   - Unable to perform sport/recreational activity due to pain and dysfunction         Updated Classification:   - Signs/symptoms consistent with post-surgical status including decreased ROM, strength and function.  - Signs/symptoms consistent with functional hip weakness/NMR control   - impaired joint mobility, motor function, muscle performance and range of motion associated with:  - joint arthroplasty. (Pattern 4H)  - bony or soft tissue surgical procedure. (Pattern 4I)  - ligament or other connective tissue dysfunction. (Pattern 4D)  - localized inflammation. (Pattern 4E)  - fracture. (Pattern 4G)  - impaired muscle performance (Pattern 4C)  - Primary Prevention/Risk Reduction for loss of balance and falling. (Pattern 5A)     Prognosis/Rehab Potential:     - Excellent  - Good   - Fair  - Poor    Factors affecting rehab potential:  - age  - apparent motivation  - apparent lack of motivation  - associated co-morbidities  - lack of associated comorbidities  - cognitive function  - lack of identified environmental, home, learning and work barriers. - identified environmental, home, learning and work barriers.     Tolerance of evaluation/treatment:   - Excellent  - Good   - Fair  - Poor    New/Updated Goals (if applicable):  [] No change to goals established upon initial eval/last progress note:  New Goals:    GOALS:   Long Term Goals: To be achieved in: 12 weeks  1. Disability index score of  60% or less for the LEFS to assist with reaching prior level of function. 2. Patient will demonstrate increased AROM to 130 degrees to allow for proper joint functioning as indicated by patients Functional Deficits. 3. Patient will demonstrate an increase in Strength to 4+/5 knee to allow for proper functional mobility as indicated by patients Functional Deficits. 4. Patient will return to  functional activities without increased symptoms or restriction. Plan of Care:  [x] Continue Current Therapy Intervention    Frequency/Duration:  2 days per week for 8 Weeks:  Interventions:  - Therapeutic exercise including: strength training, ROM/flexibility, NMR and proprioception for the proximal hip, trunk and Lower extremity  - Manual therapy as indicated including Dry Needling/IASTM, STM, PROM, Gr I-IV mobilizations, spinal mobilization/manipulation.   - Modalities as needed including: thermal agents, E-stim, US, iontophoresis as indicated. - Patient education on joint protection, activity modification, progression of HEP.         Electronically signed by:  Smita Sood, PT, MPT

## 2018-04-10 ENCOUNTER — HOSPITAL ENCOUNTER (OUTPATIENT)
Dept: PHYSICAL THERAPY | Facility: MEDICAL CENTER | Age: 53
Discharge: HOME | End: 2018-04-11
Admitting: ORTHOPAEDIC SURGERY

## 2018-04-12 ENCOUNTER — HOSPITAL ENCOUNTER (OUTPATIENT)
Dept: PHYSICAL THERAPY | Facility: MEDICAL CENTER | Age: 53
Discharge: HOME | End: 2018-04-13
Admitting: ORTHOPAEDIC SURGERY

## 2018-04-12 NOTE — FLOWSHEET NOTE
[facility]  Orthopaedics and Sports Rehabilitation, [facility]    Physical Therapy Daily Treatment Note  Date: 4/10/2018  Patient Name:  Eliud Hamlin    :  1965  MRN: [id number]  Restrictions/Precautions:    Medical/Treatment Diagnosis Information:    S83.232D (ICD-10-CM) - Complex tear of medial meniscus of left knee as current injury, subsequent encounter (s/p )  · M25.562 left knee pain      Insurance/Certification information:     Physician Information:  Referring Practitioner: dr early   Plan of care signed (Y/N):     Date of Patient follow up with Physician:     G-Code (if applicable):      Date G-Code Applied:         Progress Note: []  Yes  [x]  No  Next due by: Visit #10       Latex Allergy:  [x]NO      []YES  Preferred Language for Healthcare:   [x]English       []other:    Visit # Insurance Allowable   12 018 PT FACILITY BENEFITS [employer]/ ACTIVE 18/ DED 3000 MET/ OOP 4350/ 80% 20%/ 20 VPCY/ NO AUTH REQ/ 18 ANDREW REF# [id number]// NEW 2018 PT BENEFITS ACTIVE 3/1/18 CUSTOM DESIGN/ $30 COPAY/ OOP 4350/ 61 COMB VPCY/ AUTH REQ/ 31 MISTI REF# [id number]     Pain level: 0-3     SUBJECTIVE:  \"I was a little sore in the side of my knee recently\"     OBJECTIVE:  Moderate itb tenderness   Observation:   Test measurements:      RESTRICTIONS/PRECAUTIONS:     Exercises/Interventions:     Therapeutic Ex Sets/sec Reps Notes   Seated hamstring stretch   5 x 20\"    Quad sets   15' re-ed          slt brd   4 x 20\"     Sidelying clamshells   25x  mr   slr's (flex, abd)   30x MR    laq   45x mr    saq  45x mr    Heel prop   5# - 7'     Bike/flexionator   20'     Wall slide   12'     Gait   5'     Leg press (B)   60# - 30x; 40# - 30x     Standing hip flexion, abd   30x 2#     2\" step up   20x     Standing hamstring curls     tke   30x    30x blue     Manual Intervention      Prom    13'                                                                                                   Therapeutic Exercise and NMR EXR  [x] (21322) Provided verbal/tactile cueing for activities related to strengthening, flexibility, endurance, ROM for improvements in LE, proximal hip, and core control with self care, mobility, lifting, ambulation.  [] (11670) Provided verbal/tactile cueing for activities related to improving balance, coordination, kinesthetic sense, posture, motor skill, proprioception  to assist with LE, proximal hip, and core control in self care, mobility, lifting, ambulation and eccentric single leg control.      NMR and Therapeutic Activities:    [x] (10672 or 16537) Provided verbal/tactile cueing for activities related to improving balance, coordination, kinesthetic sense, posture, motor skill, proprioception and motor activation to allow for proper function of core, proximal hip and LE with self care and ADLs  [] (12706) Gait Re-education- Provided training and instruction to the patient for proper LE, core and proximal hip recruitment and positioning and eccentric body weight control with ambulation re-education including up and down stairs     Home Exercise Program:    [x] (20930) Reviewed/Progressed HEP activities related to strengthening, flexibility, endurance, ROM of core, proximal hip and LE for functional self-care, mobility, lifting and ambulation/stair navigation   [] (72906)Reviewed/Progressed HEP activities related to improving balance, coordination, kinesthetic sense, posture, motor skill, proprioception of core, proximal hip and LE for self care, mobility, lifting, and ambulation/stair navigation      Manual Treatments:  PROM / STM / Oscillations-Mobs:  G-I, II, III, IV (PA's, Inf., Post.)  [x] (58449) Provided manual therapy to mobilize LE, proximal hip and/or LS spine soft tissue/joints for the purpose of modulating pain, promoting relaxation,  increasing ROM, reducing/eliminating soft tissue swelling/inflammation/restriction, improving soft tissue extensibility and allowing for proper ROM for normal function with self care, mobility, lifting and ambulation. Modalities:    Cp   Charges:  Timed Code Treatment Minutes: 72'    Total Treatment Minutes: 90'      [] EVAL  [x] TC(47045) x  3   [] IONTO  [] NMR (67667) x      [] VASO  [x] Manual (92362) x  1    [] Other:  [] TA x       [] Mech Traction (33097)  [] ES(attended) (16725)      [] ES (un) (84546):     GOALS:     Progression Towards Functional goals:  [x] Patient is progressing as expected towards functional goals listed. [] Progression is slowed due to complexities listed. [] Progression has been slowed due to co-morbidities.   [] Plan just implemented, too soon to assess goals progression  [] Other:     ASSESSMENT:      Treatment/Activity Tolerance:  [x] Patient tolerated treatment well [] Patient limited by fatique  [] Patient limited by pain  [] Patient limited by other medical complications  [] Other:     Prognosis: [] Good [] Fair  [] Poor    Patient Requires Follow-up: [x] Yes  [] No    PLAN:   [x] Continue per plan of care [] Alter current plan (see comments)  [] Plan of care initiated [] Hold pending MD visit [] Discharge    Electronically signed by: Lissa Russell PT, MPT

## 2018-04-13 NOTE — FLOWSHEET NOTE
Therapeutic Exercise and NMR EXR  [x] (18463) Provided verbal/tactile cueing for activities related to strengthening, flexibility, endurance, ROM for improvements in LE, proximal hip, and core control with self care, mobility, lifting, ambulation.  [] (92875) Provided verbal/tactile cueing for activities related to improving balance, coordination, kinesthetic sense, posture, motor skill, proprioception  to assist with LE, proximal hip, and core control in self care, mobility, lifting, ambulation and eccentric single leg control.      NMR and Therapeutic Activities:    [x] (94384 or 72268) Provided verbal/tactile cueing for activities related to improving balance, coordination, kinesthetic sense, posture, motor skill, proprioception and motor activation to allow for proper function of core, proximal hip and LE with self care and ADLs  [] (50626) Gait Re-education- Provided training and instruction to the patient for proper LE, core and proximal hip recruitment and positioning and eccentric body weight control with ambulation re-education including up and down stairs     Home Exercise Program:    [x] (58708) Reviewed/Progressed HEP activities related to strengthening, flexibility, endurance, ROM of core, proximal hip and LE for functional self-care, mobility, lifting and ambulation/stair navigation   [] (52537)Reviewed/Progressed HEP activities related to improving balance, coordination, kinesthetic sense, posture, motor skill, proprioception of core, proximal hip and LE for self care, mobility, lifting, and ambulation/stair navigation      Manual Treatments:  PROM / STM / Oscillations-Mobs:  G-I, II, III, IV (PA's, Inf., Post.)  [x] (57628) Provided manual therapy to mobilize LE, proximal hip and/or LS spine soft tissue/joints for the purpose of modulating pain, promoting relaxation,  increasing ROM, reducing/eliminating soft tissue swelling/inflammation/restriction, improving soft tissue extensibility and

## 2018-04-16 ENCOUNTER — HOSPITAL ENCOUNTER (OUTPATIENT)
Dept: PHYSICAL THERAPY | Facility: MEDICAL CENTER | Age: 53
Discharge: HOME | End: 2018-04-17
Admitting: ORTHOPAEDIC SURGERY

## 2018-04-19 ENCOUNTER — HOSPITAL ENCOUNTER (OUTPATIENT)
Dept: PHYSICAL THERAPY | Facility: MEDICAL CENTER | Age: 53
Discharge: HOME | End: 2018-04-20
Admitting: ORTHOPAEDIC SURGERY

## 2018-04-20 NOTE — FLOWSHEET NOTE
Therapeutic Exercise and NMR EXR  [x] (89418) Provided verbal/tactile cueing for activities related to strengthening, flexibility, endurance, ROM for improvements in LE, proximal hip, and core control with self care, mobility, lifting, ambulation.  [] (10746) Provided verbal/tactile cueing for activities related to improving balance, coordination, kinesthetic sense, posture, motor skill, proprioception  to assist with LE, proximal hip, and core control in self care, mobility, lifting, ambulation and eccentric single leg control.      NMR and Therapeutic Activities:    [x] (01670 or 98180) Provided verbal/tactile cueing for activities related to improving balance, coordination, kinesthetic sense, posture, motor skill, proprioception and motor activation to allow for proper function of core, proximal hip and LE with self care and ADLs  [] (85429) Gait Re-education- Provided training and instruction to the patient for proper LE, core and proximal hip recruitment and positioning and eccentric body weight control with ambulation re-education including up and down stairs     Home Exercise Program:    [x] (85132) Reviewed/Progressed HEP activities related to strengthening, flexibility, endurance, ROM of core, proximal hip and LE for functional self-care, mobility, lifting and ambulation/stair navigation   [] (64741)Reviewed/Progressed HEP activities related to improving balance, coordination, kinesthetic sense, posture, motor skill, proprioception of core, proximal hip and LE for self care, mobility, lifting, and ambulation/stair navigation      Manual Treatments:  PROM / STM / Oscillations-Mobs:  G-I, II, III, IV (PA's, Inf., Post.)  [x] (44054) Provided manual therapy to mobilize LE, proximal hip and/or LS spine soft tissue/joints for the purpose of modulating pain, promoting relaxation,  increasing ROM, reducing/eliminating soft tissue swelling/inflammation/restriction,

## 2018-04-23 ENCOUNTER — HOSPITAL ENCOUNTER (OUTPATIENT)
Dept: PHYSICAL THERAPY | Facility: MEDICAL CENTER | Age: 53
Discharge: HOME | End: 2018-04-24
Admitting: ORTHOPAEDIC SURGERY

## 2018-04-25 ENCOUNTER — HOSPITAL ENCOUNTER (OUTPATIENT)
Dept: PHYSICAL THERAPY | Facility: MEDICAL CENTER | Age: 53
Discharge: HOME | End: 2018-04-26
Admitting: ORTHOPAEDIC SURGERY

## 2018-04-27 ENCOUNTER — HOSPITAL ENCOUNTER (OUTPATIENT)
Dept: PHYSICAL THERAPY | Facility: MEDICAL CENTER | Age: 53
Discharge: HOME | End: 2018-04-28
Admitting: ORTHOPAEDIC SURGERY

## 2018-04-30 ENCOUNTER — HOSPITAL ENCOUNTER (OUTPATIENT)
Dept: PHYSICAL THERAPY | Facility: MEDICAL CENTER | Age: 53
Discharge: HOME | End: 2018-05-01
Admitting: ORTHOPAEDIC SURGERY

## 2018-04-30 ENCOUNTER — OFFICE VISIT (OUTPATIENT)
Dept: ORTHOPEDIC SURGERY | Age: 53
End: 2018-04-30

## 2018-04-30 VITALS — BODY MASS INDEX: 38.9 KG/M2 | HEIGHT: 66 IN | WEIGHT: 242.06 LBS

## 2018-04-30 DIAGNOSIS — Z98.890 S/P KNEE SURGERY: Primary | ICD-10-CM

## 2018-04-30 PROCEDURE — MISC901 COLD PAC STANDARD: Performed by: ORTHOPAEDIC SURGERY

## 2018-04-30 PROCEDURE — 99024 POSTOP FOLLOW-UP VISIT: CPT | Performed by: ORTHOPAEDIC SURGERY

## 2018-04-30 RX ORDER — MELOXICAM 15 MG/1
15 TABLET ORAL DAILY
Qty: 30 TABLET | Refills: 2 | Status: SHIPPED | OUTPATIENT
Start: 2018-04-30 | End: 2019-05-29 | Stop reason: SDUPTHER

## 2018-04-30 RX ORDER — TRAMADOL HYDROCHLORIDE 50 MG/1
50 TABLET ORAL EVERY 6 HOURS PRN
Qty: 28 TABLET | Refills: 0 | Status: SHIPPED | OUTPATIENT
Start: 2018-04-30 | End: 2018-05-07

## 2018-04-30 NOTE — OP NOTE
[facility]  Orthopaedics and Sports Rehabilitation, [address], [facility] [address], [address]  Phone: [phone]   Fax:     [phone]      Date: 4/30/2018  Physician: dr cobos  Patient: Sherly Horan             Diagnosis: s/p meniscus repair     Patient has received 18 sessions of Physical Therapy      Specific Functional Improvement and Impression:  Dr Luceroazalia Giraldo has 6-7 degrees of hyperextension and 145 degrees of flexion comfortably. We have worked very hard to strengthen her leg thru long therapy sessions and a thorough home program.  She has improved greatly but still needs better isolated quad strength on the extension machine and better eccentric control with steps to walk and tolerate her needed activities. I anticipate this will improve a lot in the next month as she has been able to tolerate more intense sessions the past few weeks. Thank you     Summary:   [x] Patient is progressing as expected for this condition   [] Patient is progressing, but slower than expected for this condition   [] Patient is not progressing  Clinician Recommendations:   [x] Continue rehabilitation due to objective improvement and continued functional deficits. [] Follow up periodically to advance home exercise program to match level of function. [] Continue rehabitation due to objective improvement and continued functional deficits with progression to work conditioning. [] Discharge to post-rehab program secondary to maximizing \"medical necessity\" of physical / occupational therapy.    [] Discharge independent in a home program as:  [] All goals achieved  [] Maximized \"medical necessity\" of physical / occupational therapy  [] No subjective or objective improvements    Plan: above   Electronically signed by: Debby George PT   License #: [id number]    Physician Recommendations:  [] Follow treatment plan as above [] Discontinue physical therapy  [] Change plan to: _______________________________________________________________

## 2018-05-01 ENCOUNTER — HOSPITAL ENCOUNTER (OUTPATIENT)
Dept: OTHER | Age: 53
Discharge: HOME | End: 2018-05-31
Attending: ORTHOPAEDIC SURGERY | Admitting: ORTHOPAEDIC SURGERY

## 2018-05-02 ENCOUNTER — HOSPITAL ENCOUNTER (OUTPATIENT)
Dept: PHYSICAL THERAPY | Facility: MEDICAL CENTER | Age: 53
Discharge: HOME | End: 2018-05-03
Admitting: ORTHOPAEDIC SURGERY

## 2018-05-04 ENCOUNTER — HOSPITAL ENCOUNTER (OUTPATIENT)
Dept: PHYSICAL THERAPY | Facility: MEDICAL CENTER | Age: 53
Discharge: HOME | End: 2018-05-05
Admitting: ORTHOPAEDIC SURGERY

## 2018-05-04 NOTE — PROGRESS NOTES
Pain/Difficulty with prolonged standing- Reduced overall functional mobility   - Pain/difficulty with transfers/bed mobility- Reduced overall functional mobility  - Reduced balance/proprioceptive control- Reduced overall functional level with mobility and gait  - Pain/difficulty with ambulation or prolonged ambulation- Reduced overall functional mobility  - Unable to perform self care tasks due to pain and dysfunction- Reduced ADL status  - Pain/difficulty with household work- Reduced ADL status   - Unable to perform sport/recreational activity due to pain and dysfunction         Updated Classification:   - Signs/symptoms consistent with post-surgical status including decreased ROM, strength and function.  - Signs/symptoms consistent with functional hip weakness/NMR control   - impaired joint mobility, motor function, muscle performance and range of motion associated with:  - joint arthroplasty. (Pattern 4H)  - bony or soft tissue surgical procedure. (Pattern 4I)  - ligament or other connective tissue dysfunction. (Pattern 4D)  - localized inflammation. (Pattern 4E)  - fracture. (Pattern 4G)  - impaired muscle performance (Pattern 4C)  - Primary Prevention/Risk Reduction for loss of balance and falling. (Pattern 5A)     Prognosis/Rehab Potential:     - Excellent  - Good   - Fair  - Poor    Factors affecting rehab potential:  - age  - apparent motivation  - apparent lack of motivation  - associated co-morbidities  - lack of associated comorbidities  - cognitive function  - lack of identified environmental, home, learning and work barriers. - identified environmental, home, learning and work barriers.     Tolerance of evaluation/treatment:   - Excellent  - Good   - Fair  - Poor    New/Updated Goals (if applicable):  [] No change to goals established upon initial eval/last progress note:  New Goals:    GOALS:   8 weeks  1: I with hep  2: 0-150 degrees rom  3: 5-/5 knee strength  4: normalized gait without A device for

## 2018-05-05 NOTE — FLOWSHEET NOTE
and/or LS spine soft tissue/joints for the purpose of modulating pain, promoting relaxation,  increasing ROM, reducing/eliminating soft tissue swelling/inflammation/restriction, improving soft tissue extensibility and allowing for proper ROM for normal function with self care, mobility, lifting and ambulation. Mo  dalities:    vasoCharges:  Timed Code Treatment Minutes: 72'    Total Treatment Minutes: 90'      [] EVAL  [x] LJ(42131) x  2   [] IONTO  [] NMR (14765) x      [x] VASO  [x] Manual (69323) x  1    [] Other:  [] TA x       [] Mech Traction (44964)  [] ES(attended) (36405)      [] ES (un) (92521):     GOALS:     Progression Towards Functional goals:  [x] Patient is progressing as expected towards functional goals listed. [] Progression is slowed due to complexities listed. [] Progression has been slowed due to co-morbidities.   [] Plan just implemented, too soon to assess goals progression  [] Other:     ASSESSMENT:      Treatment/Activity Tolerance:  [x] Patient tolerated treatment well [] Patient limited by fatique  [] Patient limited by pain  [] Patient limited by other medical complications  [] Other:     Prognosis: [] Good [] Fair  [] Poor    Patient Requires Follow-up: [x] Yes  [] No    PLAN:   [x] Continue per plan of care [] Alter current plan (see comments)  [] Plan of care initiated [] Hold pending MD visit [] Discharge    Electronically signed by: Tayler Casiano PT, MPT

## 2018-05-07 ENCOUNTER — HOSPITAL ENCOUNTER (OUTPATIENT)
Dept: PHYSICAL THERAPY | Facility: MEDICAL CENTER | Age: 53
Discharge: HOME | End: 2018-05-08
Admitting: ORTHOPAEDIC SURGERY

## 2018-05-09 ENCOUNTER — HOSPITAL ENCOUNTER (OUTPATIENT)
Dept: PHYSICAL THERAPY | Facility: MEDICAL CENTER | Age: 53
Discharge: HOME | End: 2018-05-10
Admitting: ORTHOPAEDIC SURGERY

## 2018-05-09 NOTE — FLOWSHEET NOTE
[facility] [employer]  Orthopaedics and Sports Rehabilitation, [facility]    Physical Therapy Daily Treatment Note  Date: 2018  Patient Name:  Tonya Arcos    :  1965  MRN: [id number]  Restrictions/Precautions:    Medical/Treatment Diagnosis Information:    S83.232D (ICD-10-CM) - Complex tear of medial meniscus of left knee as current injury, subsequent encounter (s/p )  · M25.562 left knee pain      Insurance/Certification information:     Physician Information:  Referring Practitioner: dr early   Plan of care signed (Y/N):     Date of Patient follow up with Physician:     G-Code (if applicable):      Date G-Code Applied:         Progress Note: []  Yes  [x]  No  Next due by: Visit #10       Latex Allergy:  [x]NO      []YES  Preferred Language for Healthcare:   [x]English       []other:    Visit # Insurance Allowable   23 018  [facility],Third Floor 18/ DED 3000 MET/ OOP 4350/ 80% 20%/ 20 VPCY/ NO AUTH REQ/ 18 ANDREW REF# [id number]// NEW 2018 PT BENEFITS ACTIVE 3/1/18 CUSTOM DESIGN/ $30 COPAY/ OOP 4350/ 60 COMB VPCY/ AUTH REQ/ 14 MISTI REF# [id number]     Pain level: 0-2    SUBJECTIVE:  \"i'm working at it. [employer] \"     OBJECTIVE:    Observation:    Test measurements:      RESTRICTIONS/PRECAUTIONS:     Exercises/Interventions:     Therapeutic Ex Sets/sec Reps Notes   Seated hamstring stretch   5 x 20\"    Quad sets   30x     Bridges   30x     slt brd   4 x 20\"     Sidelying clamshells   25x  mr   slr's (flex, abd,add)   30x 5#;     laq   45x 10#    saq  45x mr    Heel prop/prone hang    5# - 7'/ 3#     Bike/flexionator   20'     Wall slide   12'     Weight shifts   3'     Leg press (B)   100# - 30x; 80# - 30x; 60#    Standing hip flexion, abd   30x 3#     6\" step up; 2\" lsd    30x; 30x      Seated hamstring curls         Wall minisquats       Ext machine              SLS           Step overs     Side shuffle   30x 40# - 30x; 20# - 30x     20 x 5\"      30#; ecc20# - 30x; 10# - 30x        4 x 30\" modulating pain, promoting relaxation,  increasing ROM, reducing/eliminating soft tissue swelling/inflammation/restriction, improving soft tissue extensibility and allowing for proper ROM for normal function with self care, mobility, lifting and ambulation. Mo  dalities:    vasoCharges:  Timed Code Treatment Minutes: 72'    Total Treatment Minutes: 90'      [] EVAL  [x] TQ(33126) x  2   [] IONTO  [] NMR (57056) x      [x] VASO  [x] Manual (24086) x  1    [] Other:  [] TA x       [] Mech Traction (13144)  [] ES(attended) (23381)      [] ES (un) (11112):     GOALS:     Progression Towards Functional goals:  [x] Patient is progressing as expected towards functional goals listed. [] Progression is slowed due to complexities listed. [] Progression has been slowed due to co-morbidities.   [] Plan just implemented, too soon to assess goals progression  [] Other:     ASSESSMENT:      Treatment/Activity Tolerance:  [x] Patient tolerated treatment well [] Patient limited by fatique  [] Patient limited by pain  [] Patient limited by other medical complications  [] Other:     Prognosis: [] Good [] Fair  [] Poor    Patient Requires Follow-up: [x] Yes  [] No    PLAN:   [x] Continue per plan of care [] Alter current plan (see comments)  [] Plan of care initiated [] Hold pending MD visit [] Discharge    Electronically signed by: Pushpa Coleman PT, MPT

## 2018-05-09 NOTE — FLOWSHEET NOTE
foam        20x 4\"     10' x 2 laps     Manual Intervention      Prom    13'                                                                                                   Therapeutic Exercise and NMR EXR  [x] (27497) Provided verbal/tactile cueing for activities related to strengthening, flexibility, endurance, ROM for improvements in LE, proximal hip, and core control with self care, mobility, lifting, ambulation.  [] (39993) Provided verbal/tactile cueing for activities related to improving balance, coordination, kinesthetic sense, posture, motor skill, proprioception  to assist with LE, proximal hip, and core control in self care, mobility, lifting, ambulation and eccentric single leg control.      NMR and Therapeutic Activities:    [x] (55203 or 39197) Provided verbal/tactile cueing for activities related to improving balance, coordination, kinesthetic sense, posture, motor skill, proprioception and motor activation to allow for proper function of core, proximal hip and LE with self care and ADLs  [] (86727) Gait Re-education- Provided training and instruction to the patient for proper LE, core and proximal hip recruitment and positioning and eccentric body weight control with ambulation re-education including up and down stairs     Home Exercise Program:    [x] (37558) Reviewed/Progressed HEP activities related to strengthening, flexibility, endurance, ROM of core, proximal hip and LE for functional self-care, mobility, lifting and ambulation/stair navigation   [] (07643)Reviewed/Progressed HEP activities related to improving balance, coordination, kinesthetic sense, posture, motor skill, proprioception of core, proximal hip and LE for self care, mobility, lifting, and ambulation/stair navigation      Manual Treatments:  PROM / STM / Oscillations-Mobs:  G-I, II, III, IV (PA's, Inf., Post.)  [x] (72023) Provided manual therapy to mobilize LE, proximal hip and/or LS spine soft tissue/joints for the purpose of modulating pain, promoting relaxation,  increasing ROM, reducing/eliminating soft tissue swelling/inflammation/restriction, improving soft tissue extensibility and allowing for proper ROM for normal function with self care, mobility, lifting and ambulation. Mo  dalities:    vasoCharges:  Timed Code Treatment Minutes: 72'    Total Treatment Minutes: 90'      [] EVAL  [x] WI(81478) x  2   [] IONTO  [] NMR (53782) x      [x] VASO  [x] Manual (14760) x  1    [] Other:  [] TA x       [] Mech Traction (60508)  [] ES(attended) (40855)      [] ES (un) (73536):     GOALS:     Progression Towards Functional goals:  [x] Patient is progressing as expected towards functional goals listed. [] Progression is slowed due to complexities listed. [] Progression has been slowed due to co-morbidities.   [] Plan just implemented, too soon to assess goals progression  [] Other:     ASSESSMENT:      Treatment/Activity Tolerance:  [x] Patient tolerated treatment well [] Patient limited by fatique  [] Patient limited by pain  [] Patient limited by other medical complications  [] Other:     Prognosis: [] Good [] Fair  [] Poor    Patient Requires Follow-up: [x] Yes  [] No    PLAN:   [x] Continue per plan of care [] Alter current plan (see comments)  [] Plan of care initiated [] Hold pending MD visit [] Discharge    Electronically signed by: Sánchez Emery PT, MPT

## 2018-05-11 ENCOUNTER — HOSPITAL ENCOUNTER (OUTPATIENT)
Dept: PHYSICAL THERAPY | Facility: MEDICAL CENTER | Age: 53
Discharge: HOME | End: 2018-05-12
Admitting: ORTHOPAEDIC SURGERY

## 2018-05-14 ENCOUNTER — HOSPITAL ENCOUNTER (OUTPATIENT)
Dept: PHYSICAL THERAPY | Facility: MEDICAL CENTER | Age: 53
Discharge: HOME | End: 2018-05-15
Admitting: ORTHOPAEDIC SURGERY

## 2018-05-22 ENCOUNTER — HOSPITAL ENCOUNTER (OUTPATIENT)
Dept: PHYSICAL THERAPY | Facility: MEDICAL CENTER | Age: 53
Discharge: HOME | End: 2018-05-23
Admitting: ORTHOPAEDIC SURGERY

## 2018-05-24 NOTE — FLOWSHEET NOTE
[facility]  Orthopaedics and Sports Rehabilitation, [facility]    Physical Therapy Daily Treatment Note  Date: 2018  Patient Name:  Ignacio Ingram    :  1965  MRN: [id number]  Restrictions/Precautions:    Medical/Treatment Diagnosis Information:    S83.232D (ICD-10-CM) - Complex tear of medial meniscus of left knee as current injury, subsequent encounter (s/p )  · M25.562 left knee pain      Insurance/Certification information:     Physician Information:  Referring Practitioner: dr early   Plan of care signed (Y/N):     Date of Patient follow up with Physician:     G-Code (if applicable):      Date G-Code Applied:         Progress Note: []  Yes  [x]  No  Next due by: Visit #10       Latex Allergy:  [x]NO      []YES  Preferred Language for Healthcare:   [x]English       []other:    Visit # Insurance Allowable   25 018  [facility],Third Floor 18/ DED 3000 MET/ OOP 4350/ 80% 20%/ 20 VPCY/ NO AUTH REQ/ 18 ANDREW REF# [id number]// NEW 2018 PT BENEFITS ACTIVE 3/1/18 CUSTOM DESIGN/ $30 COPAY/ OOP 4350/ 61 COMB VPCY/ AUTH REQ/ 1/3/02 MISTI REF# [id number]     Pain level: 0-2    SUBJECTIVE:  \"I have lost ground due to the insurance hold up\"     OBJECTIVE:    Observation:   Unable to perform stairs reciprocally today   Test measurements:      RESTRICTIONS/PRECAUTIONS:     Exercises/Interventions:     Therapeutic Ex Sets/sec Reps Notes   Seated hamstring stretch   5 x 20\"    Quad sets   30x     Bridges; prone hamstring curls    30x; 30x mr      slt brd   4 x 20\"     Sidelying clamshells   25x  mr   slr's (flex, abd,add)   30x 6#;     laq   45x 10#    saq  45x 10#    Heel prop/prone hang    5# - 7'/ 3#     Bike/flexionator   20'     Wall slide   12'     Weight shifts   3'     Leg press (B)   160# - 30x; 120# - 30x; 100#          6\" ant step up; 6\" lsd   30x; 30x      Seated hamstring curls         Wall minisquats       Ext machine              SLS           Step overs     Side shuffle

## 2018-05-25 ENCOUNTER — HOSPITAL ENCOUNTER (OUTPATIENT)
Dept: PHYSICAL THERAPY | Facility: MEDICAL CENTER | Age: 53
Discharge: HOME | End: 2018-05-26
Admitting: ORTHOPAEDIC SURGERY

## 2018-05-28 NOTE — FLOWSHEET NOTE
[facility] [employer]  Orthopaedics and Sports Rehabilitation, [facility]    Physical Therapy Daily Treatment Note  Date: 2018  Patient Name:  Tonya Arcos    :  1965  MRN: [id number]  Restrictions/Precautions:    Medical/Treatment Diagnosis Information:    S83.232D (ICD-10-CM) - Complex tear of medial meniscus of left knee as current injury, subsequent encounter (s/p )  · M25.562 left knee pain      Insurance/Certification information:     Physician Information:  Referring Practitioner: dr early   Plan of care signed (Y/N):     Date of Patient follow up with Physician:     G-Code (if applicable):      Date G-Code Applied:         Progress Note: []  Yes  [x]  No  Next due by: Visit #10       Latex Allergy:  [x]NO      []YES  Preferred Language for Healthcare:   [x]English       []other:    Visit # Insurance Allowable   26 018 PT FACILITY BENEFITS [employer]/ ACTIVE 18/ DED 3000 MET/ OOP 4350/ 80% 20%/ 20 VPCY/ NO AUTH REQ/ 18 ANDREW REF# [id number]// NEW 2018 PT BENEFITS ACTIVE 3/1/18 CUSTOM DESIGN/ $30 COPAY/ OOP 4350/ 60 COMB VPCY/ AUTH REQ/  MISTI REF# [id number]     Pain level: 0-2    SUBJECTIVE:  \"I am doing better than I was last week. Wm White \"     OBJECTIVE:    Observation:  Improved proprioception    Test measurements:      RESTRICTIONS/PRECAUTIONS:     Exercises/Interventions:     Therapeutic Ex Sets/sec Reps Notes   Seated hamstring stretch   5 x 20\"    Quad sets   30x     Bridges; prone hamstring curls    30x; 30x mr      slt brd   4 x 20\"     Sidelying clamshells   25x  mr   slr's (flex, abd,add)   30x 6#;     laq   45x 10#    saq  45x 10#    Heel prop/prone hang    5# - 7'/ 3#     Bike/flexionator   20'     Wall slide   12'     Weight shifts   3'     Leg press (B)   160# - 30x; 120# - 30x; 100#          6\" ant step up; 6\" lsd   30x; 30x      Seated hamstring curls         Wall minisquats       Ext machine              SLS           Step overs     Side shuffle           45x 40# - 45x; 20# - 30x 20 x 5\"      35#; ecc20# - 45xx; 15# - 45x        4 x 30\" foam        20x 6\"      10' x 2 laps     Manual Intervention      Prom    13'                                                                                                   Therapeutic Exercise and NMR EXR  [x] (47232) Provided verbal/tactile cueing for activities related to strengthening, flexibility, endurance, ROM for improvements in LE, proximal hip, and core control with self care, mobility, lifting, ambulation.  [] (00323) Provided verbal/tactile cueing for activities related to improving balance, coordination, kinesthetic sense, posture, motor skill, proprioception  to assist with LE, proximal hip, and core control in self care, mobility, lifting, ambulation and eccentric single leg control.      NMR and Therapeutic Activities:    [x] (34452 or 99326) Provided verbal/tactile cueing for activities related to improving balance, coordination, kinesthetic sense, posture, motor skill, proprioception and motor activation to allow for proper function of core, proximal hip and LE with self care and ADLs  [] (20212) Gait Re-education- Provided training and instruction to the patient for proper LE, core and proximal hip recruitment and positioning and eccentric body weight control with ambulation re-education including up and down stairs     Home Exercise Program:    [x] (44551) Reviewed/Progressed HEP activities related to strengthening, flexibility, endurance, ROM of core, proximal hip and LE for functional self-care, mobility, lifting and ambulation/stair navigation   [] (54615)Reviewed/Progressed HEP activities related to improving balance, coordination, kinesthetic sense, posture, motor skill, proprioception of core, proximal hip and LE for self care, mobility, lifting, and ambulation/stair navigation      Manual Treatments:  PROM / STM / Oscillations-Mobs:  G-I, II, III, IV (PA's, Inf., Post.)  [x] (66268) Provided manual therapy to mobilize LE,

## 2018-05-29 ENCOUNTER — HOSPITAL ENCOUNTER (OUTPATIENT)
Dept: PHYSICAL THERAPY | Facility: MEDICAL CENTER | Age: 53
Discharge: HOME | End: 2018-05-30
Admitting: ORTHOPAEDIC SURGERY

## 2018-05-30 NOTE — FLOWSHEET NOTE
mobilize LE, proximal hip and/or LS spine soft tissue/joints for the purpose of modulating pain, promoting relaxation,  increasing ROM, reducing/eliminating soft tissue swelling/inflammation/restriction, improving soft tissue extensibility and allowing for proper ROM for normal function with self care, mobility, lifting and ambulation. Mo  dalities:    Cp      Charges:  Timed Code Treatment Minutes: 28' (one on one)   Total Treatment Minutes: 90'      [] EVAL  [x] DENIS(37617) x  1   [] IONTO  [] NMR (81546) x      [] VASO  [x] Manual (74338) x  1    [] Other:  [] TA x       [] Mech Traction (76599)  [] ES(attended) (30705)      [] ES (un) (49394):     GOALS:     Progression Towards Functional goals:  [x] Patient is progressing as expected towards functional goals listed. [] Progression is slowed due to complexities listed. [] Progression has been slowed due to co-morbidities.   [] Plan just implemented, too soon to assess goals progression  [] Other:     ASSESSMENT:      Treatment/Activity Tolerance:  [x] Patient tolerated treatment well [] Patient limited by fatique  [] Patient limited by pain  [] Patient limited by other medical complications  [] Other:     Prognosis: [] Good [] Fair  [] Poor    Patient Requires Follow-up: [x] Yes  [] No    PLAN:   [x] Continue per plan of care [] Alter current plan (see comments)  [] Plan of care initiated [] Hold pending MD visit [] Discharge    Electronically signed by: Samuel Wetzel PT, MPT

## 2018-05-31 ENCOUNTER — OFFICE VISIT (OUTPATIENT)
Dept: ORTHOPEDIC SURGERY | Age: 53
End: 2018-05-31

## 2018-05-31 VITALS — BODY MASS INDEX: 38.9 KG/M2 | WEIGHT: 242.06 LBS | HEIGHT: 66 IN

## 2018-05-31 DIAGNOSIS — Z98.890 S/P ARTHROSCOPIC KNEE SURGERY: Primary | ICD-10-CM

## 2018-05-31 PROCEDURE — 99213 OFFICE O/P EST LOW 20 MIN: CPT | Performed by: ORTHOPAEDIC SURGERY

## 2018-06-01 ENCOUNTER — HOSPITAL ENCOUNTER (OUTPATIENT)
Dept: OTHER | Age: 53
Discharge: HOME | End: 2018-06-30
Attending: ORTHOPAEDIC SURGERY | Admitting: ORTHOPAEDIC SURGERY

## 2018-06-03 NOTE — FLOWSHEET NOTE
[facility] [employer]  Orthopaedics and Sports Rehabilitation, [facility]    Physical Therapy Daily Treatment Note  Date: 2018  Patient Name:  Helen Naylor    :  1965  MRN: [id number]  Restrictions/Precautions:    Medical/Treatment Diagnosis Information:    S83.232D (ICD-10-CM) - Complex tear of medial meniscus of left knee as current injury, subsequent encounter (s/p )  · M25.562 left knee pain      Insurance/Certification information:     Physician Information:  Referring Practitioner: dr early   Plan of care signed (Y/N):     Date of Patient follow up with Physician:     G-Code (if applicable):      Date G-Code Applied:         Progress Note: []  Yes  [x]  No  Next due by: Visit #10       Latex Allergy:  [x]NO      []YES  Preferred Language for Healthcare:   [x]English       []other:    Visit # Insurance Allowable   28 018 PT FACILITY BENEFITS [facility]/ ACTIVE 18/ DED 3000 MET/ OOP 4350/ 80% 20%/ 20 VPCY/ NO AUTH REQ/ 18 ANDREW REF# [id number]// NEW 2018 PT BENEFITS ACTIVE 3/1/18 CUSTOM DESIGN/ $30 COPAY/ OOP 4350/ 60 COMB VPCY/ AUTH REQ/ 83 MISTI REF# [id number]     Pain level: 0-2    SUBJECTIVE:  \"my back hurts. .\"     OBJECTIVE:    Observation:   Mild asymmetry with descension of stairs   Test measurements:      RESTRICTIONS/PRECAUTIONS:     Exercises/Interventions:     Therapeutic Ex Sets/sec Reps Notes   Seated hamstring stretch   5 x 20\"    Quad sets   30x     Bridges; prone hamstring curls    30x; 30x mr      slt brd   4 x 20\"     Sidelying clamshells   25x  mr   slr's (flex, abd,add)   30x 6#;     laq   45x 10#    saq  45x 10#    Heel prop/prone hang    5# - 7'/ 3#     Bike/flexionator   20'     Wall slide   12'     Weight shifts   3'     Leg press (B)   120# - 30x           6\" ant step up; 6\" lsd   30x; 30x      Seated hamstring curls         Wall minisquats       Ext machine              SLS           Step overs     Side shuffle           45x 40# - 45x; 20# - 30x     20 x 5\"      35#; ecc20# - 45xx; 15# - 45x        4 x 30\" foam        20x 6\"      10' x 2 laps     Manual Intervention      Prom    23'                                                                                                   Therapeutic Exercise and NMR EXR  [x] (99310) Provided verbal/tactile cueing for activities related to strengthening, flexibility, endurance, ROM for improvements in LE, proximal hip, and core control with self care, mobility, lifting, ambulation.  [] (48439) Provided verbal/tactile cueing for activities related to improving balance, coordination, kinesthetic sense, posture, motor skill, proprioception  to assist with LE, proximal hip, and core control in self care, mobility, lifting, ambulation and eccentric single leg control.      NMR and Therapeutic Activities:    [x] (90883 or 54108) Provided verbal/tactile cueing for activities related to improving balance, coordination, kinesthetic sense, posture, motor skill, proprioception and motor activation to allow for proper function of core, proximal hip and LE with self care and ADLs  [] (90439) Gait Re-education- Provided training and instruction to the patient for proper LE, core and proximal hip recruitment and positioning and eccentric body weight control with ambulation re-education including up and down stairs     Home Exercise Program:    [x] (84026) Reviewed/Progressed HEP activities related to strengthening, flexibility, endurance, ROM of core, proximal hip and LE for functional self-care, mobility, lifting and ambulation/stair navigation   [] (80169)Reviewed/Progressed HEP activities related to improving balance, coordination, kinesthetic sense, posture, motor skill, proprioception of core, proximal hip and LE for self care, mobility, lifting, and ambulation/stair navigation      Manual Treatments:  PROM / STM / Oscillations-Mobs:  G-I, II, III, IV (PA's, Inf., Post.)  [x] (99655) Provided manual therapy to mobilize LE, proximal hip and/or LS

## 2018-06-04 ENCOUNTER — OFFICE VISIT (OUTPATIENT)
Dept: ORTHOPEDIC SURGERY | Age: 53
End: 2018-06-04

## 2018-06-04 VITALS
WEIGHT: 241.14 LBS | HEIGHT: 66 IN | HEART RATE: 81 BPM | BODY MASS INDEX: 38.75 KG/M2 | SYSTOLIC BLOOD PRESSURE: 129 MMHG | DIASTOLIC BLOOD PRESSURE: 80 MMHG

## 2018-06-04 DIAGNOSIS — M54.50 LUMBAR SPINE PAIN: Primary | ICD-10-CM

## 2018-06-04 DIAGNOSIS — M70.61 TROCHANTERIC BURSITIS OF RIGHT HIP: ICD-10-CM

## 2018-06-04 DIAGNOSIS — S39.012A STRAIN OF LUMBAR REGION, INITIAL ENCOUNTER: ICD-10-CM

## 2018-06-04 DIAGNOSIS — M25.551 RIGHT HIP PAIN: ICD-10-CM

## 2018-06-04 PROCEDURE — 20610 DRAIN/INJ JOINT/BURSA W/O US: CPT | Performed by: FAMILY MEDICINE

## 2018-06-04 PROCEDURE — L0625 LO FLEX L1-BELOW L5 PRE OTS: HCPCS | Performed by: FAMILY MEDICINE

## 2018-06-04 PROCEDURE — 99214 OFFICE O/P EST MOD 30 MIN: CPT | Performed by: FAMILY MEDICINE

## 2018-06-04 RX ORDER — METHYLPREDNISOLONE 4 MG/1
TABLET ORAL
Qty: 21 KIT | Refills: 0 | Status: SHIPPED | OUTPATIENT
Start: 2018-06-04 | End: 2018-06-04 | Stop reason: CLARIF

## 2018-06-04 RX ORDER — MELOXICAM 15 MG/1
15 TABLET ORAL DAILY
Qty: 30 TABLET | Refills: 3 | Status: SHIPPED | OUTPATIENT
Start: 2018-06-04 | End: 2018-06-04 | Stop reason: CLARIF

## 2018-06-04 RX ORDER — MELOXICAM 15 MG/1
15 TABLET ORAL DAILY
Qty: 30 TABLET | Refills: 3 | Status: SHIPPED | OUTPATIENT
Start: 2018-06-04 | End: 2018-08-27 | Stop reason: DRUGHIGH

## 2018-06-04 RX ORDER — METHYLPREDNISOLONE 4 MG/1
TABLET ORAL
Qty: 21 KIT | Refills: 0 | Status: SHIPPED | OUTPATIENT
Start: 2018-06-04

## 2018-06-05 ENCOUNTER — HOSPITAL ENCOUNTER (OUTPATIENT)
Dept: PHYSICAL THERAPY | Facility: MEDICAL CENTER | Age: 53
Discharge: HOME | End: 2018-06-06
Admitting: ORTHOPAEDIC SURGERY

## 2018-06-07 ENCOUNTER — HOSPITAL ENCOUNTER (OUTPATIENT)
Dept: PHYSICAL THERAPY | Facility: MEDICAL CENTER | Age: 53
Discharge: HOME | End: 2018-06-08
Admitting: ORTHOPAEDIC SURGERY

## 2018-06-07 NOTE — FLOWSHEET NOTE
[facility] [employer]  Orthopaedics and Sports Rehabilitation, [facility]    Physical Therapy Daily Treatment Note  Date: 2018  Patient Name:  Barak Bernal    :  1965  MRN: [id number]  Restrictions/Precautions:    Medical/Treatment Diagnosis Information:    S83.232D (ICD-10-CM) - Complex tear of medial meniscus of left knee as current injury, subsequent encounter (s/p )  · M25.562 left knee pain      Insurance/Certification information:     Physician Information:  Referring Practitioner: dr early   Plan of care signed (Y/N):     Date of Patient follow up with Physician:     G-Code (if applicable):      Date G-Code Applied:         Progress Note: []  Yes  [x]  No  Next due by: Visit #10       Latex Allergy:  [x]NO      []YES  Preferred Language for Healthcare:   [x]English       []other:    Visit # Insurance Allowable   29 018  [facility],Third Floor 18/ DED 3000 MET/ OOP 4350/ 80% 20%/ 20 VPCY/ NO AUTH REQ/ 18 ANDREW REF# 2488// NEW 2018 PT BENEFITS ACTIVE 3/1/18 CUSTOM DESIGN/ $30 COPAY/ OOP 4350/ 61 COMB VPCY/ AUTH REQ/ 3/4/71 MISTI REF# [id number]     Pain level: 0-2    SUBJECTIVE:  \"my back hurts still\"     OBJECTIVE:    Observation:   Mild asymmetry with descension of stairs   Test measurements:      RESTRICTIONS/PRECAUTIONS:     Exercises/Interventions:     Therapeutic Ex Sets/sec Reps Notes   Seated hamstring stretch   5 x 20\"    Quad sets   30x     Bridges; prone hamstring curls    30x; 30x mr      slt brd   4 x 20\"     Sidelying clamshells   25x  mr   slr's (flex, abd,add)   30x 6#;     laq   45x 10#    saq  45x 10#    Heel prop/prone hang    5# - 7'/ 3#     Bike/flexionator   20'     Wall slide   12'     Weight shifts   3'     Leg press (B)   120# - 30x           6\" ant step up; 6\" lsd   30x; 30x      Seated hamstring curls         Wall minisquats       Ext machine              SLS           Step overs     Side shuffle           45x 40# - 45x; 20# - 30x     20 x 5\"      35#;

## 2018-06-09 NOTE — FLOWSHEET NOTE
[facility] [employer]  Orthopaedics and Sports Rehabilitation, [facility]    Physical Therapy Daily Treatment Note  Date: 2018  Patient Name:  Ignacio Ingram    :  1965  MRN: [id number]  Restrictions/Precautions:    Medical/Treatment Diagnosis Information:    S83.232D (ICD-10-CM) - Complex tear of medial meniscus of left knee as current injury, subsequent encounter (s/p )  · M25.562 left knee pain      Insurance/Certification information:     Physician Information:  Referring Practitioner: dr early   Plan of care signed (Y/N):     Date of Patient follow up with Physician:     G-Code (if applicable):      Date G-Code Applied:         Progress Note: []  Yes  [x]  No  Next due by: Visit #10       Latex Allergy:  [x]NO      []YES  Preferred Language for Healthcare:   [x]English       []other:    Visit # Insurance Allowable   30 018  [facility],Third Floor 18/ DED 3000 MET/ OOP 4350/ 80% 20%/ 20 VPCY/ NO AUTH REQ/ 18 ANDREW REF# 2488// NEW 2018 PT BENEFITS ACTIVE 3/1/18 CUSTOM DESIGN/ $30 COPAY/ OOP 4350/ 61 COMB VPCY/ AUTH REQ/ 3/0/31 MISTI REF# [id number]     Pain level: 0-2    SUBJECTIVE:  \"my back is still an issue\"     OBJECTIVE:    Observation:   Moderate asymmetry with ascending stairs   Test measurements:      RESTRICTIONS/PRECAUTIONS:     Exercises/Interventions:     Therapeutic Ex Sets/sec Reps Notes   Seated hamstring stretch   5 x 20\"    Quad sets   30x     Bridges; prone hamstring curls    30x; 30x mr      slt brd   4 x 20\"     Sidelying clamshells   25x  mr   slr's (flex, abd,add)   30x 6#;     laq   45x 10#    saq  45x 10#    Heel prop/prone hang    5# - 7'/ 3#     Bike/flexionator   20'     Wall slide   12'     Weight shifts   3'     Leg press (B)   120# - 30x           6\" ant step up; 6\" lsd   30x; 30x      Seated hamstring curls         Wall minisquats       Ext machine              SLS           Step overs     Side shuffle           45x 40# - 45x; 20# - 30x     20 x

## 2018-06-11 ENCOUNTER — HOSPITAL ENCOUNTER (OUTPATIENT)
Dept: PHYSICAL THERAPY | Facility: MEDICAL CENTER | Age: 53
Discharge: HOME | End: 2018-06-12
Admitting: ORTHOPAEDIC SURGERY

## 2018-06-13 NOTE — FLOWSHEET NOTE
current plan (see comments)  [] Plan of care initiated [] Hold pending MD visit [] Discharge    Electronically signed by: Valentino Arguello PT, MPT

## 2018-06-14 ENCOUNTER — HOSPITAL ENCOUNTER (OUTPATIENT)
Dept: PHYSICAL THERAPY | Facility: MEDICAL CENTER | Age: 53
Discharge: HOME | End: 2018-06-15
Admitting: ORTHOPAEDIC SURGERY

## 2018-06-17 NOTE — FLOWSHEET NOTE
and/or LS spine soft tissue/joints for the purpose of modulating pain, promoting relaxation,  increasing ROM, reducing/eliminating soft tissue swelling/inflammation/restriction, improving soft tissue extensibility and allowing for proper ROM for normal function with self care, mobility, lifting and ambulation. Mo  dalities:    Cp    Charges:  Timed Code Treatment Minutes: 52'    Total Treatment Minutes: 90'      [] EVAL  [x] ZY(19406) x  2   [] IONTO  [] NMR (37642) x      [] VASO  [x] Manual (38659) x  1    [] Other:  [] TA x       [] Mech Traction (44240)  [] ES(attended) (24549)      [] ES (un) (85528):     GOALS:   Long Term Goals: To be achieved in: 12 weeks reset on 5/12  1. Disability index score of  10% or less for the LEFS to assist with reaching prior level of function. 2. Patient will demonstrate increased AROM to 140 degrees to allow for proper joint functioning as indicated by patients Functional Deficits. 3. Patient will demonstrate an increase in Strength to 4+/5 knee to allow for proper functional mobility as indicated by patients Functional Deficits. 4. Patient will return to  functional activities without increased symptoms or restriction. 5: no gait deviations with walking or stair ambulation      Progression Towards Functional goals:  [x] Patient is progressing as expected towards functional goals listed. [] Progression is slowed due to complexities listed. [] Progression has been slowed due to co-morbidities.   [] Plan just implemented, too soon to assess goals progression  [] Other:     ASSESSMENT:      Treatment/Activity Tolerance:  [x] Patient tolerated treatment well [] Patient limited by fatique  [] Patient limited by pain  [] Patient limited by other medical complications  [] Other:     Prognosis: [] Good [] Fair  [] Poor    Patient Requires Follow-up: [x] Yes  [] No    PLAN:   [x] Continue per plan of care [] Alter current plan (see comments)  [] Plan of care initiated []

## 2018-06-17 NOTE — FLOWSHEET NOTE
[facility] [employer]  Orthopaedics and Sports Rehabilitation, [facility]    Physical Therapy Daily Treatment Note  Date: 2018  Patient Name:  Alesia Moeller    :  1965  MRN: [id number]  Restrictions/Precautions:    Medical/Treatment Diagnosis Information:    S83.232D (ICD-10-CM) - Complex tear of medial meniscus of left knee as current injury, subsequent encounter (s/p )  · M25.562 left knee pain      Insurance/Certification information:     Physician Information:  Referring Practitioner: dr early   Plan of care signed (Y/N):     Date of Patient follow up with Physician:     G-Code (if applicable):      Date G-Code Applied:         Progress Note: []  Yes  [x]  No  Next due by: Visit #10       Latex Allergy:  [x]NO      []YES  Preferred Language for Healthcare:   [x]English       []other:    Visit # Insurance Allowable   32 018  [facility],Third Floor 18/ DED 3000 MET/ OOP 4350/ 80% 20%/ 20 VPCY/ NO AUTH REQ/ 18 ANDREW REF# 2488// NEW 2018 PT BENEFITS ACTIVE 3/1/18 CUSTOM DESIGN/ $30 COPAY/ OOP 4350/ 60 COMB VPCY/ AUTH REQ/ 47 MISTI REF# [id number]     Pain level: 0-2    SUBJECTIVE:  \"not catching as much now. .\"     OBJECTIVE:    Observation:   Mild asymmetry with ascending stairs   Test measurements:      RESTRICTIONS/PRECAUTIONS:     Exercises/Interventions:     Therapeutic Ex Sets/sec Reps Notes   Seated hamstring stretch   5 x 20\"    Quad sets   30x     Bridges; prone hamstring curls    30x; 30x mr      slt brd   4 x 20\"     Sidelying clamshells   25x  mr   slr's (flex, abd,add)   30x 6#;     laq   45x 10#    saq  45x 10#    Heel prop/prone hang    5# - 7'/ 3#     Bike/flexionator   20'     Wall slide   12'     Weight shifts   3'     Leg press (B)   120# - 30x           6\" ant step up; 6\" lsd   30x; 30x      Seated hamstring curls         Wall minisquats       Ext machine              SLS           Step overs     Side shuffle           45x 40# - 45x; 20# - 30x     20 x 5\"      35#; ecc20# - 45xx; 15# - 45x        4 x 30\" foam        20x 6\"      10' x 2 laps     Manual Intervention      Prom    23'                                                                                                   Therapeutic Exercise and NMR EXR  [x] (35726) Provided verbal/tactile cueing for activities related to strengthening, flexibility, endurance, ROM for improvements in LE, proximal hip, and core control with self care, mobility, lifting, ambulation.  [] (39996) Provided verbal/tactile cueing for activities related to improving balance, coordination, kinesthetic sense, posture, motor skill, proprioception  to assist with LE, proximal hip, and core control in self care, mobility, lifting, ambulation and eccentric single leg control.      NMR and Therapeutic Activities:    [x] (27789 or 78316) Provided verbal/tactile cueing for activities related to improving balance, coordination, kinesthetic sense, posture, motor skill, proprioception and motor activation to allow for proper function of core, proximal hip and LE with self care and ADLs  [] (00664) Gait Re-education- Provided training and instruction to the patient for proper LE, core and proximal hip recruitment and positioning and eccentric body weight control with ambulation re-education including up and down stairs     Home Exercise Program:    [x] (52669) Reviewed/Progressed HEP activities related to strengthening, flexibility, endurance, ROM of core, proximal hip and LE for functional self-care, mobility, lifting and ambulation/stair navigation   [] (21683)Reviewed/Progressed HEP activities related to improving balance, coordination, kinesthetic sense, posture, motor skill, proprioception of core, proximal hip and LE for self care, mobility, lifting, and ambulation/stair navigation      Manual Treatments:  PROM / STM / Oscillations-Mobs:  G-I, II, III, IV (PA's, Inf., Post.)  [x] (59036) Provided manual therapy to mobilize LE, proximal hip and/or LS spine soft tissue/joints for the purpose of modulating pain, promoting relaxation,  increasing ROM, reducing/eliminating soft tissue swelling/inflammation/restriction, improving soft tissue extensibility and allowing for proper ROM for normal function with self care, mobility, lifting and ambulation. Mo  dalities:    Cp    Charges:  Timed Code Treatment Minutes: 52'    Total Treatment Minutes: 90'      [] EVAL  [x] QA(25521) x  2   [] IONTO  [] NMR (19647) x      [] VASO  [x] Manual (07810) x  1    [] Other:  [] TA x       [] Mech Traction (58742)  [] ES(attended) (30642)      [] ES (un) (55349):     GOALS:   Long Term Goals: To be achieved in: 12 weeks reset on 5/12  1. Disability index score of  10% or less for the LEFS to assist with reaching prior level of function. 2. Patient will demonstrate increased AROM to 140 degrees to allow for proper joint functioning as indicated by patients Functional Deficits. 3. Patient will demonstrate an increase in Strength to 4+/5 knee to allow for proper functional mobility as indicated by patients Functional Deficits. 4. Patient will return to  functional activities without increased symptoms or restriction. 5: no gait deviations with walking or stair ambulation      Progression Towards Functional goals:  [x] Patient is progressing as expected towards functional goals listed. [] Progression is slowed due to complexities listed. [] Progression has been slowed due to co-morbidities.   [] Plan just implemented, too soon to assess goals progression  [] Other:     ASSESSMENT:      Treatment/Activity Tolerance:  [x] Patient tolerated treatment well [] Patient limited by fatique  [] Patient limited by pain  [] Patient limited by other medical complications  [] Other:     Prognosis: [] Good [] Fair  [] Poor    Patient Requires Follow-up: [x] Yes  [] No    PLAN:   [x] Continue per plan of care [] Alter current plan (see comments)  [] Plan of care initiated []

## 2018-06-19 ENCOUNTER — HOSPITAL ENCOUNTER (OUTPATIENT)
Dept: PHYSICAL THERAPY | Facility: MEDICAL CENTER | Age: 53
Discharge: HOME | End: 2018-06-20
Admitting: ORTHOPAEDIC SURGERY

## 2018-06-21 ENCOUNTER — HOSPITAL ENCOUNTER (OUTPATIENT)
Dept: PHYSICAL THERAPY | Facility: MEDICAL CENTER | Age: 53
Discharge: HOME | End: 2018-06-22
Admitting: ORTHOPAEDIC SURGERY

## 2018-06-26 ENCOUNTER — HOSPITAL ENCOUNTER (OUTPATIENT)
Dept: PHYSICAL THERAPY | Facility: MEDICAL CENTER | Age: 53
Discharge: HOME | End: 2018-06-27
Admitting: ORTHOPAEDIC SURGERY

## 2018-06-27 ENCOUNTER — OFFICE VISIT (OUTPATIENT)
Dept: ORTHOPEDIC SURGERY | Age: 53
End: 2018-06-27

## 2018-06-27 VITALS — BODY MASS INDEX: 38.73 KG/M2 | WEIGHT: 241 LBS | HEIGHT: 66 IN

## 2018-06-27 DIAGNOSIS — M79.672 LEFT FOOT PAIN: Primary | ICD-10-CM

## 2018-06-27 DIAGNOSIS — M70.61 TROCHANTERIC BURSITIS OF RIGHT HIP: ICD-10-CM

## 2018-06-27 DIAGNOSIS — S39.012D STRAIN OF LUMBAR REGION, SUBSEQUENT ENCOUNTER: ICD-10-CM

## 2018-06-27 DIAGNOSIS — M54.50 LUMBAR SPINE PAIN: ICD-10-CM

## 2018-06-27 DIAGNOSIS — M65.9 SYNOVITIS OF LEFT FOOT: ICD-10-CM

## 2018-06-27 PROCEDURE — 99214 OFFICE O/P EST MOD 30 MIN: CPT | Performed by: FAMILY MEDICINE

## 2018-06-28 ENCOUNTER — HOSPITAL ENCOUNTER (OUTPATIENT)
Dept: PHYSICAL THERAPY | Facility: MEDICAL CENTER | Age: 53
Discharge: HOME | End: 2018-06-29
Admitting: ORTHOPAEDIC SURGERY

## 2018-07-01 ENCOUNTER — HOSPITAL ENCOUNTER (OUTPATIENT)
Dept: OTHER | Age: 53
Discharge: HOME | End: 2018-07-01
Attending: ORTHOPAEDIC SURGERY | Admitting: ORTHOPAEDIC SURGERY

## 2018-07-03 ENCOUNTER — HOSPITAL ENCOUNTER (OUTPATIENT)
Dept: PHYSICAL THERAPY | Facility: MEDICAL CENTER | Age: 53
Discharge: HOME | End: 2018-07-04
Admitting: ORTHOPAEDIC SURGERY

## 2018-07-04 NOTE — FLOWSHEET NOTE
[facility] [employer]  Orthopaedics and Sports Rehabilitation, [facility]    Physical Therapy Daily Treatment Note  Date: 7/3/2018  Patient Name:  Eliud Hamlin    :  1965  MRN: [id number]  Restrictions/Precautions:    Medical/Treatment Diagnosis Information:    S83.232D (ICD-10-CM) - Complex tear of medial meniscus of left knee as current injury, subsequent encounter (s/p )  · M25.562 left knee pain      Insurance/Certification information:     Physician Information:  Referring Practitioner: dr early   Plan of care signed (Y/N):     Date of Patient follow up with Physician:     G-Code (if applicable):      Date G-Code Applied:         Progress Note: []  Yes  [x]  No  Next due by: Visit #10       Latex Allergy:  [x]NO      []YES  Preferred Language for Healthcare:   [x]English       []other:    Visit # Insurance Allowable   14 of 24      Pain level: 0-2    SUBJECTIVE:  \"doing well overall.   Sore after really pushing it at work\"     OBJECTIVE:    Observation:   Normalized gait except mild limb velocity asymmetry   Test measurements:      RESTRICTIONS/PRECAUTIONS:     Exercises/Interventions:     Therapeutic Ex Sets/sec Reps Notes   Bike     Flexionator/wall slide       Seated hamstring stretch   8'     8'/5'       4x           Bridges; prone hamstring curls    single 30x; 30x mr            Sidelying clamshells   25x  mr   slr's (flex, abd,add)   30x 6#;     laq   45x 10#    saq  45x 10#    Heel prop/prone hang        slt brd   4x                 Leg press (B)   160# - 30x; 120#; 100#           6\" ant step up; 4\" - 30x   30x; 30x     Seated hamstring curls           Wall minisquats       Ext machine              SLS           Step overs     Side shuffle           45# - 45x; 25# - 45xx             30#; 15# - 45x            4 x 30\" foam        20x 8\"      10' x 2 laps - green     Manual Intervention      Prom    13'

## 2018-07-05 ENCOUNTER — HOSPITAL ENCOUNTER (OUTPATIENT)
Dept: PHYSICAL THERAPY | Facility: MEDICAL CENTER | Age: 53
Discharge: HOME | End: 2018-07-06
Admitting: ORTHOPAEDIC SURGERY

## 2018-07-10 ENCOUNTER — HOSPITAL ENCOUNTER (OUTPATIENT)
Dept: PHYSICAL THERAPY | Facility: MEDICAL CENTER | Age: 53
Discharge: HOME | End: 2018-07-11
Admitting: ORTHOPAEDIC SURGERY

## 2018-07-10 NOTE — FLOWSHEET NOTE
16'    Charges:  Timed Code Treatment Minutes: 54'    Total Treatment Minutes: 12:50-2:47  117'      [] EVAL  [x] BC(56126) x  2   [] IONTO  [] NMR (53613) x      [x] VASO  [x] Manual (10016) x  1    [] Other:  [x] TA x  1    [] Mech Traction (42262)  [] ES(attended) (28906)      [] ES (un) (59422):     GOALS:   Long Term Goals: To be achieved in: 12 weeks reset on 5/12  1. Disability index score of  10% or less for the LEFS to assist with reaching prior level of function. 2. Patient will demonstrate increased AROM to 140 degrees to allow for proper joint functioning as indicated by patients Functional Deficits. 3. Patient will demonstrate an increase in Strength to 4+/5 knee to allow for proper functional mobility as indicated by patients Functional Deficits. 4. Patient will return to  functional activities without increased symptoms or restriction. 5: no gait deviations with walking or stair ambulation    Progression Towards Functional goals:  [x] Patient is progressing as expected towards functional goals listed. [] Progression is slowed due to complexities listed. [] Progression has been slowed due to co-morbidities.   [] Plan just implemented, too soon to assess goals progression  [] Other:     ASSESSMENT:      Treatment/Activity Tolerance:  [x] Patient tolerated treatment well [x] Patient limited by fatique  [x] Patient limited by pain  [] Patient limited by other medical complications  [] Other:     Prognosis: [] Good [] Fair  [] Poor    Patient Requires Follow-up: [x] Yes  [] No    PLAN:   [x] Continue per plan of care [] Alter current plan (see comments)  [] Plan of care initiated [] Hold pending MD visit [] Discharge    Electronically signed by: Bernardo Leventhal PT, DPT, ATC

## 2018-07-16 ENCOUNTER — OFFICE VISIT (OUTPATIENT)
Dept: ORTHOPEDIC SURGERY | Age: 53
End: 2018-07-16

## 2018-07-16 VITALS — BODY MASS INDEX: 38.73 KG/M2 | WEIGHT: 241 LBS | HEIGHT: 66 IN

## 2018-07-16 DIAGNOSIS — Z98.890 S/P LEFT KNEE ARTHROSCOPY: Primary | ICD-10-CM

## 2018-07-16 PROCEDURE — 99213 OFFICE O/P EST LOW 20 MIN: CPT | Performed by: ORTHOPAEDIC SURGERY

## 2018-07-16 NOTE — PROGRESS NOTES
Chief Complaint  Follow-up (Left Knee: S/P medial menisus root repair, chondroplasty Trochlea, medial femoral condlye SX 2/8/18 ( 5 months out); work has her busy and she has had to use cane more; PT thinks she maybe getting tendinitis and IT band tightness; at every step there is a sharp pain; )      History of Present Illness:  Surya Johnson is a 45 y.o. y/o female who presents today for follow up of her left knee. She is now 5 months out from a left knee medial meniscus root repair and chondroplasty trochlea and medial femoral condyle. She states overall she is doing okay. She still feels like her knee is somewhat weak and she has some tightness with her IT band and does have some pain with her knee with prolonged weightbearing. She is back to work 30 hours a week and is using a crutch at times. No new injuries. Medical History  Past Medical History:   Diagnosis Date    Acid reflux     Acute bronchitis 5/19/2010    Anxiety state, unspecified 5/19/2010    Chondromalacia of patella 5/19/2010    Degeneration of cervical intervertebral disc 5/19/2010    Diverticulitis of colon (without mention of hemorrhage)(562.11) 2/11/3236    Dysmetabolic syndrome X 9/24/5246    Excessive or frequent menstruation 5/19/2010    Insomnia, unspecified 5/19/2010    Leiomyoma of uterus, unspecified 5/19/2010    Lumbago 5/23/2010    Tachycardia        Past Surgical History:   Procedure Laterality Date    COLECTOMY      X 2 perforated colon from diverticulitis and then blockage in colon    HYSTERECTOMY      KNEE ARTHROSCOPY Left 02/08/2018    medial meniscus root repair    SINUS SURGERY  2001       Social History     Social History    Marital status:      Spouse name: N/A    Number of children: N/A    Years of education: N/A     Occupational History    Not on file.      Social History Main Topics    Smoking status: Never Smoker    Smokeless tobacco: Never Used    Alcohol use No    Drug use: No    Sexual

## 2018-07-17 ENCOUNTER — HOSPITAL ENCOUNTER (OUTPATIENT)
Dept: PHYSICAL THERAPY | Age: 53
Setting detail: THERAPIES SERIES
Discharge: HOME | End: 2018-07-17
Payer: COMMERCIAL

## 2018-07-17 PROCEDURE — 97140 MANUAL THERAPY 1/> REGIONS: CPT | Performed by: PHYSICAL THERAPIST

## 2018-07-17 PROCEDURE — 97110 THERAPEUTIC EXERCISES: CPT | Performed by: PHYSICAL THERAPIST

## 2018-07-19 ENCOUNTER — HOSPITAL ENCOUNTER (OUTPATIENT)
Dept: PHYSICAL THERAPY | Age: 53
Setting detail: THERAPIES SERIES
Discharge: HOME | End: 2018-07-19
Payer: COMMERCIAL

## 2018-07-19 PROCEDURE — 97016 VASOPNEUMATIC DEVICE THERAPY: CPT | Performed by: PHYSICAL THERAPIST

## 2018-07-19 PROCEDURE — 97140 MANUAL THERAPY 1/> REGIONS: CPT | Performed by: PHYSICAL THERAPIST

## 2018-07-19 PROCEDURE — 97110 THERAPEUTIC EXERCISES: CPT | Performed by: PHYSICAL THERAPIST

## 2018-07-20 NOTE — FLOWSHEET NOTE
[facility] [address] [place] [address] [facility]    [facility] Therapy Daily Treatment Note  Date: 2018  Patient Name:  Trinity Dominguez    :  1965  MRN: [id number]  Restrictions/Precautions:    Medical/Treatment Diagnosis Information:    S83.232D (ICD-10-CM) - Complex tear of medial meniscus of left knee as current injury, subsequent encounter (s/p )  · M25.562 left knee pain      Insurance/Certification information:     Physician Information:  Referring Practitioner: dr early   Plan of care signed (Y/N):     Date of Patient follow up with Physician:     G-Code (if applicable):      Date G-Code Applied:         Progress Note: []  Yes  [x]  No  Next due by: Visit #10       Latex Allergy:  [x]NO      []YES  Preferred Language for Healthcare:   [x]English       []other:    Visit # Insurance Allowable   16 of 24      Pain level: 0-2    SUBJECTIVE:  Pt reports her knee is feeling better!     OBJECTIVE:    Observation:   Normalized gait except mild limb velocity asymmetry   Test measurements:      RESTRICTIONS/PRECAUTIONS:     Exercises/Interventions:     Therapeutic Ex Sets/sec Reps Notes   Bike     Flexionator/wall slide       5'     8'/5'     4x      prone hamstring curls    single 30x; 30x mr      Sidelying clamshells   25x     slr's (flex, abd,add)   30x 6#     laq   30x 10#      45x 10#    Heel prop/prone hang        slt brd   4x     Leg press (B)   140# - 30x; 100#; 80#     6\" ant step up; 4\" - 30x   30x; 30x     Seated hamstring curls         Wall mini squats     Ext machine       SLS                  40# - 45x; 20# - 45x         30#; 10# - 45x    4 x 30\" foam    20x 8\"      10' x 2 laps - green     Manual Intervention      Prom    10'                                                                                                   Therapeutic Exercise and NMR EXR  [x] (40686) Provided verbal/tactile cueing for activities related to strengthening, flexibility, endurance, ROM for Minutes: 28'    Total Treatment Minutes:   117'      [] EVAL  [x] [place](40645) x  1   [] IONTO  [] NMR (20100) x      [x] VASO  [x] Manual (18246) x  1    [] Other:  [] TA x       [] Mech Traction (10296)  [] ES(attended) (24476)      [] ES (un) (62948):     GOALS:   Long Term Goals: To be achieved in: 12 weeks reset on 5/12  1. Disability index score of  10% or less for the LEFS to assist with reaching prior level of function. 2. Patient will demonstrate increased AROM to 140 degrees to allow for proper joint functioning as indicated by patients Functional Deficits. 3. Patient will demonstrate an increase in Strength to 4+/5 knee to allow for proper functional mobility as indicated by patients Functional Deficits. 4. Patient will return to  functional activities without increased symptoms or restriction. 5: no gait deviations with walking or stair ambulation    Progression Towards Functional goals:  [x] Patient is progressing as expected towards functional goals listed. [] Progression is slowed due to complexities listed. [] Progression has been slowed due to co-morbidities.   [] Plan just implemented, too soon to assess goals progression  [] Other:     ASSESSMENT:      Treatment/Activity Tolerance:  [x] Patient tolerated treatment well [x] Patient limited by fatique  [x] Patient limited by pain  [] Patient limited by other medical complications  [] Other:     Prognosis: [] Good [] Fair  [] Poor    Patient Requires Follow-up: [x] Yes  [] No    PLAN:   [x] Continue per plan of care [] Alter current plan (see comments)  [] Plan of care initiated [] Hold pending MD visit [] Discharge    Electronically signed by: Lamberto Downing PT, MPT

## 2018-07-24 ENCOUNTER — HOSPITAL ENCOUNTER (OUTPATIENT)
Dept: PHYSICAL THERAPY | Age: 53
Setting detail: THERAPIES SERIES
Discharge: HOME | End: 2018-07-24
Payer: COMMERCIAL

## 2018-07-24 PROCEDURE — 97110 THERAPEUTIC EXERCISES: CPT | Performed by: PHYSICAL THERAPIST

## 2018-07-24 PROCEDURE — 97140 MANUAL THERAPY 1/> REGIONS: CPT | Performed by: PHYSICAL THERAPIST

## 2018-07-25 NOTE — FLOWSHEET NOTE
flexibility, endurance, ROM for improvements in LE, proximal hip, and core control with self care, mobility, lifting, ambulation.  [] (05342) Provided verbal/tactile cueing for activities related to improving balance, coordination, kinesthetic sense, posture, motor skill, proprioception  to assist with LE, proximal hip, and core control in self care, mobility, lifting, ambulation and eccentric single leg control. NMR and Therapeutic Activities:    [x] (73641 or 30597) Provided verbal/tactile cueing for activities related to improving balance, coordination, kinesthetic sense, posture, motor skill, proprioception and motor activation to allow for proper function of core, proximal hip and LE with self care and ADLs  [] (40996) Gait Re-education- Provided training and instruction to the patient for proper LE, core and proximal hip recruitment and positioning and eccentric body weight control with ambulation re-education including up and down stairs     Home Exercise Program:    [x] (77144) Reviewed/Progressed HEP activities related to strengthening, flexibility, endurance, ROM of core, proximal hip and LE for functional self-care, mobility, lifting and ambulation/stair navigation   [] (43756)Reviewed/Progressed HEP activities related to improving balance, coordination, kinesthetic sense, posture, motor skill, proprioception of core, proximal hip and LE for self care, mobility, lifting, and ambulation/stair navigation      Manual Treatments:  PROM / STM / Oscillations-Mobs:  G-I, II, III, IV (PA's, Inf., Post.)  [x] (17786) Provided manual therapy to mobilize LE, proximal hip and/or LS spine soft tissue/joints for the purpose of modulating pain, promoting relaxation,  increasing ROM, reducing/eliminating soft tissue swelling/inflammation/restriction, improving soft tissue extensibility and allowing for proper ROM for normal function with self care, mobility, lifting and ambulation.      Modalities:

## 2018-07-26 ENCOUNTER — HOSPITAL ENCOUNTER (OUTPATIENT)
Dept: PHYSICAL THERAPY | Age: 53
Setting detail: THERAPIES SERIES
Discharge: HOME | End: 2018-07-26
Payer: COMMERCIAL

## 2018-07-26 PROCEDURE — 97112 NEUROMUSCULAR REEDUCATION: CPT | Performed by: PHYSICAL THERAPIST

## 2018-07-26 PROCEDURE — 97140 MANUAL THERAPY 1/> REGIONS: CPT | Performed by: PHYSICAL THERAPIST

## 2018-07-26 PROCEDURE — 97110 THERAPEUTIC EXERCISES: CPT | Performed by: PHYSICAL THERAPIST

## 2018-07-26 PROCEDURE — 97016 VASOPNEUMATIC DEVICE THERAPY: CPT | Performed by: PHYSICAL THERAPIST

## 2018-07-26 NOTE — FLOWSHEET NOTE
Treatment Minutes:   117'      [] EVAL  [x] OS(96468) x  1   [] IONTO  [x] NMR (71781) x  1   [x] VASO  [x] Manual (14128) x  1    [] Other:  [] TA x       [] Mech Traction (26880)  [] ES(attended) (15591)      [] ES (un) (73316):     GOALS:   Long Term Goals: To be achieved in: 12 weeks reset on 5/12  1. Disability index score of  10% or less for the LEFS to assist with reaching prior level of function. 2. Patient will demonstrate increased AROM to 140 degrees to allow for proper joint functioning as indicated by patients Functional Deficits. 3. Patient will demonstrate an increase in Strength to 4+/5 knee to allow for proper functional mobility as indicated by patients Functional Deficits. 4. Patient will return to  functional activities without increased symptoms or restriction. 5: no gait deviations with walking or stair ambulation    Progression Towards Functional goals:  [x] Patient is progressing as expected towards functional goals listed. [] Progression is slowed due to complexities listed. [] Progression has been slowed due to co-morbidities.   [] Plan just implemented, too soon to assess goals progression  [] Other:     ASSESSMENT:      Treatment/Activity Tolerance:  [x] Patient tolerated treatment well [x] Patient limited by fatique  [x] Patient limited by pain  [] Patient limited by other medical complications  [] Other:     Prognosis: [] Good [] Fair  [] Poor    Patient Requires Follow-up: [x] Yes  [] No    PLAN:   [x] Continue per plan of care [] Alter current plan (see comments)  [] Plan of care initiated [] Hold pending MD visit [] Discharge    Electronically signed by: Eligio Cary PT, MPT

## 2018-08-02 ENCOUNTER — HOSPITAL ENCOUNTER (OUTPATIENT)
Dept: PHYSICAL THERAPY | Age: 53
Setting detail: THERAPIES SERIES
Discharge: HOME | End: 2018-08-02
Payer: COMMERCIAL

## 2018-08-02 PROCEDURE — 97016 VASOPNEUMATIC DEVICE THERAPY: CPT | Performed by: PHYSICAL THERAPIST

## 2018-08-02 PROCEDURE — 97140 MANUAL THERAPY 1/> REGIONS: CPT | Performed by: PHYSICAL THERAPIST

## 2018-08-02 PROCEDURE — 97110 THERAPEUTIC EXERCISES: CPT | Performed by: PHYSICAL THERAPIST

## 2018-08-03 NOTE — FLOWSHEET NOTE
LE, proximal hip, and core control with self care, mobility, lifting, ambulation.  [] (39234) Provided verbal/tactile cueing for activities related to improving balance, coordination, kinesthetic sense, posture, motor skill, proprioception  to assist with LE, proximal hip, and core control in self care, mobility, lifting, ambulation and eccentric single leg control. NMR and Therapeutic Activities:    [x] (33119 or 09282) Provided verbal/tactile cueing for activities related to improving balance, coordination, kinesthetic sense, posture, motor skill, proprioception and motor activation to allow for proper function of core, proximal hip and LE with self care and ADLs  [] (64252) Gait Re-education- Provided training and instruction to the patient for proper LE, core and proximal hip recruitment and positioning and eccentric body weight control with ambulation re-education including up and down stairs     Home Exercise Program:    [x] (95089) Reviewed/Progressed HEP activities related to strengthening, flexibility, endurance, ROM of core, proximal hip and LE for functional self-care, mobility, lifting and ambulation/stair navigation   [] (47125)Reviewed/Progressed HEP activities related to improving balance, coordination, kinesthetic sense, posture, motor skill, proprioception of core, proximal hip and LE for self care, mobility, lifting, and ambulation/stair navigation      Manual Treatments:  PROM / STM / Oscillations-Mobs:  G-I, II, III, IV (PA's, Inf., Post.)  [x] (30789) Provided manual therapy to mobilize LE, proximal hip and/or LS spine soft tissue/joints for the purpose of modulating pain, promoting relaxation,  increasing ROM, reducing/eliminating soft tissue swelling/inflammation/restriction, improving soft tissue extensibility and allowing for proper ROM for normal function with self care, mobility, lifting and ambulation.      Modalities:  Vaso     Charges:  Timed Code Treatment Minutes: 46'    Total Treatment Minutes:   117'      [] EVAL  [x] IF(78296) x  1   [] IONTO  [] NMR (79995) x      [x] VASO  [x] Manual (37271) x  1    [] Other:  [] TA x       [] Mech Traction (11043)  [] ES(attended) (73655)      [] ES (un) (12889):     GOALS:   Long Term Goals: To be achieved in: 12 weeks reset on 5/12  1. Disability index score of  10% or less for the LEFS to assist with reaching prior level of function. 2. Patient will demonstrate increased AROM to 140 degrees to allow for proper joint functioning as indicated by patients Functional Deficits. 3. Patient will demonstrate an increase in Strength to 4+/5 knee to allow for proper functional mobility as indicated by patients Functional Deficits. 4. Patient will return to  functional activities without increased symptoms or restriction. 5: no gait deviations with walking or stair ambulation    Progression Towards Functional goals:  [x] Patient is progressing as expected towards functional goals listed. [] Progression is slowed due to complexities listed. [] Progression has been slowed due to co-morbidities.   [] Plan just implemented, too soon to assess goals progression  [] Other:     ASSESSMENT:      Treatment/Activity Tolerance:  [x] Patient tolerated treatment well [x] Patient limited by fatique  [x] Patient limited by pain  [] Patient limited by other medical complications  [] Other:     Prognosis: [] Good [] Fair  [] Poor    Patient Requires Follow-up: [x] Yes  [] No    PLAN:   [x] Continue per plan of care [] Alter current plan (see comments)  [] Plan of care initiated [] Hold pending MD visit [] Discharge    Electronically signed by: Pablo Medrano PT, MPT

## 2018-08-09 ENCOUNTER — APPOINTMENT (OUTPATIENT)
Dept: PHYSICAL THERAPY | Age: 53
End: 2018-08-09
Payer: COMMERCIAL

## 2018-08-09 ENCOUNTER — HOSPITAL ENCOUNTER (OUTPATIENT)
Dept: PHYSICAL THERAPY | Age: 53
Setting detail: THERAPIES SERIES
Discharge: HOME | End: 2018-08-09
Payer: COMMERCIAL

## 2018-08-09 PROCEDURE — 97110 THERAPEUTIC EXERCISES: CPT | Performed by: PHYSICAL THERAPIST

## 2018-08-09 PROCEDURE — 97140 MANUAL THERAPY 1/> REGIONS: CPT | Performed by: PHYSICAL THERAPIST

## 2018-08-09 PROCEDURE — 97016 VASOPNEUMATIC DEVICE THERAPY: CPT | Performed by: PHYSICAL THERAPIST

## 2018-08-16 ENCOUNTER — HOSPITAL ENCOUNTER (OUTPATIENT)
Dept: PHYSICAL THERAPY | Age: 53
Setting detail: THERAPIES SERIES
Discharge: HOME | End: 2018-08-16
Payer: COMMERCIAL

## 2018-08-16 PROCEDURE — 97110 THERAPEUTIC EXERCISES: CPT | Performed by: PHYSICAL THERAPIST

## 2018-08-16 PROCEDURE — 97016 VASOPNEUMATIC DEVICE THERAPY: CPT | Performed by: PHYSICAL THERAPIST

## 2018-08-16 PROCEDURE — 97140 MANUAL THERAPY 1/> REGIONS: CPT | Performed by: PHYSICAL THERAPIST

## 2018-08-19 NOTE — FLOWSHEET NOTE
activities related to strengthening, flexibility, endurance, ROM for improvements in LE, proximal hip, and core control with self care, mobility, lifting, ambulation.  [] (16180) Provided verbal/tactile cueing for activities related to improving balance, coordination, kinesthetic sense, posture, motor skill, proprioception  to assist with LE, proximal hip, and core control in self care, mobility, lifting, ambulation and eccentric single leg control.      NMR and Therapeutic Activities:    [x] (98847 or 95037) Provided verbal/tactile cueing for activities related to improving balance, coordination, kinesthetic sense, posture, motor skill, proprioception and motor activation to allow for proper function of core, proximal hip and LE with self care and ADLs  [] (72528) Gait Re-education- Provided training and instruction to the patient for proper LE, core and proximal hip recruitment and positioning and eccentric body weight control with ambulation re-education including up and down stairs     Home Exercise Program:    [x] (01983) Reviewed/Progressed HEP activities related to strengthening, flexibility, endurance, ROM of core, proximal hip and LE for functional self-care, mobility, lifting and ambulation/stair navigation   [] (76537)Reviewed/Progressed HEP activities related to improving balance, coordination, kinesthetic sense, posture, motor skill, proprioception of core, proximal hip and LE for self care, mobility, lifting, and ambulation/stair navigation      Manual Treatments:  PROM / STM / Oscillations-Mobs:  G-I, II, III, IV (PA's, Inf., Post.)  [x] (69601) Provided manual therapy to mobilize LE, proximal hip and/or LS spine soft tissue/joints for the purpose of modulating pain, promoting relaxation,  increasing ROM, reducing/eliminating soft tissue swelling/inflammation/restriction, improving soft tissue extensibility and allowing for proper ROM for normal function with self care, mobility, lifting and

## 2018-08-22 ENCOUNTER — HOSPITAL ENCOUNTER (OUTPATIENT)
Dept: PHYSICAL THERAPY | Age: 53
Setting detail: THERAPIES SERIES
Discharge: HOME | End: 2018-08-22
Payer: COMMERCIAL

## 2018-08-22 PROCEDURE — 97140 MANUAL THERAPY 1/> REGIONS: CPT | Performed by: PHYSICAL THERAPIST

## 2018-08-22 PROCEDURE — G8979 MOBILITY GOAL STATUS: HCPCS | Performed by: PHYSICAL THERAPIST

## 2018-08-22 PROCEDURE — 97110 THERAPEUTIC EXERCISES: CPT | Performed by: PHYSICAL THERAPIST

## 2018-08-22 PROCEDURE — G8978 MOBILITY CURRENT STATUS: HCPCS | Performed by: PHYSICAL THERAPIST

## 2018-08-24 NOTE — PROGRESS NOTES
[address] [facility], [facility] [address]  Phone: [phone]   Fax:     [phone]  Date: 8/23/2018  Physician: dr cobos   Patient: Britany Rosario Diagnosis: S83.232D (ICD-10-CM) - Complex tear of medial meniscus of left knee as current injury, subsequent encounter (s/p 2/8)  M25.562 left knee pain          Specific Functional Improvement and Impression:  Sandra Bedolla is doing a 'tad' better this week at work regarding knee pain and functionality. She is still having substantial medial joint line tenderness and pain. She is still having some knee 'buckling' episodes and is unable to ambulate with a normal gait. 4/5 weightbearing strength and 38% disability score currently. Unable to descend stairs reciprocally. Substantial regression overall since returning to work.         Plan:  Continue 1x week to regain functional control and strength  Electronically signed by: Pablo Medrano PT   License #: [id number]     Physician Recommendations:  [] Follow treatment plan as above [] Discontinue physical therapy  [] Change plan to: _______________________________________________________________

## 2018-08-27 ENCOUNTER — OFFICE VISIT (OUTPATIENT)
Dept: ORTHOPEDIC SURGERY | Age: 53
End: 2018-08-27

## 2018-08-27 VITALS — BODY MASS INDEX: 38.73 KG/M2 | WEIGHT: 241 LBS | HEIGHT: 66 IN

## 2018-08-27 DIAGNOSIS — Z98.890 S/P LEFT KNEE ARTHROSCOPY: Primary | ICD-10-CM

## 2018-08-27 PROCEDURE — 99213 OFFICE O/P EST LOW 20 MIN: CPT | Performed by: ORTHOPAEDIC SURGERY

## 2018-08-27 NOTE — PROGRESS NOTES
Chief Complaint  Follow-up (CK L KNEE: S/P medial menisus root repair, chondroplasty Trochlea, medial femoral condlye SX 2/8/18 )      History of Present Illness:  Maxime Renee is a 52 y.o. y/o female who presents today for follow up of her left knee. She is now 0.5 months status post left knee arthroscopy with chondroplasty trochlea and medial femoral condyle and meniscus root repair. Overall she is doing okay, but continues having pain while at work. She states the pain is in the anterolateral aspect her knee with her IT band as well as the medial aspect of her knee. She continues using her crutches work occasionally. She denies any new acute injury. She has continued doing physical therapy and taking meloxicam.      Medical History  Past Medical History:   Diagnosis Date    Acid reflux     Acute bronchitis 5/19/2010    Anxiety state, unspecified 5/19/2010    Chondromalacia of patella 5/19/2010    Degeneration of cervical intervertebral disc 5/19/2010    Diverticulitis of colon (without mention of hemorrhage)(562.11) 4/75/3629    Dysmetabolic syndrome X 0/36/6773    Excessive or frequent menstruation 5/19/2010    Insomnia, unspecified 5/19/2010    Leiomyoma of uterus, unspecified 5/19/2010    Lumbago 5/23/2010    Tachycardia        Past Surgical History:   Procedure Laterality Date    COLECTOMY      X 2 perforated colon from diverticulitis and then blockage in colon    HYSTERECTOMY      KNEE ARTHROSCOPY Left 02/08/2018    medial meniscus root repair    SINUS SURGERY  2001       Social History     Social History    Marital status:      Spouse name: N/A    Number of children: N/A    Years of education: N/A     Occupational History    Not on file.      Social History Main Topics    Smoking status: Never Smoker    Smokeless tobacco: Never Used    Alcohol use No    Drug use: No    Sexual activity: Not on file     Other Topics Concern    Not on file     Social History Narrative    No

## 2018-09-20 ENCOUNTER — HOSPITAL ENCOUNTER (OUTPATIENT)
Dept: PHYSICAL THERAPY | Age: 53
Setting detail: THERAPIES SERIES
Discharge: HOME | End: 2018-09-20
Payer: COMMERCIAL

## 2018-09-20 PROCEDURE — 97110 THERAPEUTIC EXERCISES: CPT | Performed by: PHYSICAL THERAPIST

## 2018-09-20 PROCEDURE — 97140 MANUAL THERAPY 1/> REGIONS: CPT | Performed by: PHYSICAL THERAPIST

## 2018-09-24 NOTE — FLOWSHEET NOTE
Exercise and NMR EXR  [x] (19784) Provided verbal/tactile cueing for activities related to strengthening, flexibility, endurance, ROM for improvements in LE, proximal hip, and core control with self care, mobility, lifting, ambulation.  [] (33672) Provided verbal/tactile cueing for activities related to improving balance, coordination, kinesthetic sense, posture, motor skill, proprioception  to assist with LE, proximal hip, and core control in self care, mobility, lifting, ambulation and eccentric single leg control.      NMR and Therapeutic Activities:    [x] (31968 or 49391) Provided verbal/tactile cueing for activities related to improving balance, coordination, kinesthetic sense, posture, motor skill, proprioception and motor activation to allow for proper function of core, proximal hip and LE with self care and ADLs  [] (37383) Gait Re-education- Provided training and instruction to the patient for proper LE, core and proximal hip recruitment and positioning and eccentric body weight control with ambulation re-education including up and down stairs     Home Exercise Program:    [x] (05773) Reviewed/Progressed HEP activities related to strengthening, flexibility, endurance, ROM of core, proximal hip and LE for functional self-care, mobility, lifting and ambulation/stair navigation   [] (56931)Reviewed/Progressed HEP activities related to improving balance, coordination, kinesthetic sense, posture, motor skill, proprioception of core, proximal hip and LE for self care, mobility, lifting, and ambulation/stair navigation      Manual Treatments:  PROM / STM / Oscillations-Mobs:  G-I, II, III, IV (PA's, Inf., Post.)  [x] (93014) Provided manual therapy to mobilize LE, proximal hip and/or LS spine soft tissue/joints for the purpose of modulating pain, promoting relaxation,  increasing ROM, reducing/eliminating soft tissue swelling/inflammation/restriction, improving soft tissue extensibility and allowing for proper ROM for normal function with self care, mobility, lifting and ambulation. Modalities:      Charges:  Timed Code Treatment Minutes: 37'    Total Treatment Minutes: 90'      [] EVAL  [x] HQ(08415) x  2   [] IONTO  [] NMR (29498) x      [] VASO  [x] Manual (68460) x  1    [] Other:  [] TA x       [] Mech Traction (54808)  [] ES(attended) (21288)      [] ES (un) (72209):     GOALS:   Long Term Goals: To be achieved in: 12 weeks reset on 5/12  1. Disability index score of  10% or less for the LEFS to assist with reaching prior level of function. 2. Patient will demonstrate increased AROM to 140 degrees to allow for proper joint functioning as indicated by patients Functional Deficits. 3. Patient will demonstrate an increase in Strength to 4+/5 knee to allow for proper functional mobility as indicated by patients Functional Deficits. 4. Patient will return to  functional activities without increased symptoms or restriction. 5: no gait deviations with walking or stair ambulation    Progression Towards Functional goals:  [x] Patient is progressing as expected towards functional goals listed. [] Progression is slowed due to complexities listed. [] Progression has been slowed due to co-morbidities.   [] Plan just implemented, too soon to assess goals progression  [] Other:     ASSESSMENT:      Treatment/Activity Tolerance:  [x] Patient tolerated treatment well [x] Patient limited by fatique  [x] Patient limited by pain  [] Patient limited by other medical complications  [] Other:     Prognosis: [] Good [] Fair  [] Poor    Patient Requires Follow-up: [x] Yes  [] No    PLAN:   [x] Continue per plan of care [] Alter current plan (see comments)  [] Plan of care initiated [] Hold pending MD visit [] Discharge    Electronically signed by: Ezequiel James PT, MPT

## 2018-09-27 ENCOUNTER — HOSPITAL ENCOUNTER (OUTPATIENT)
Dept: PHYSICAL THERAPY | Age: 53
Setting detail: THERAPIES SERIES
Discharge: HOME | End: 2018-09-27
Payer: COMMERCIAL

## 2018-09-27 PROCEDURE — 97016 VASOPNEUMATIC DEVICE THERAPY: CPT | Performed by: PHYSICAL THERAPIST

## 2018-09-27 PROCEDURE — 97140 MANUAL THERAPY 1/> REGIONS: CPT | Performed by: PHYSICAL THERAPIST

## 2018-09-27 PROCEDURE — 97110 THERAPEUTIC EXERCISES: CPT | Performed by: PHYSICAL THERAPIST

## 2018-09-30 NOTE — FLOWSHEET NOTE
[facility]    [facility] Therapy Daily Treatment Note  Date:   Patient Name:  Cyndi Wilcox    :  1965  MRN: [id number]  Restrictions/Precautions:    Medical/Treatment Diagnosis Information:    S83.232D (ICD-10-CM) - Complex tear of medial meniscus of left knee as current injury, subsequent encounter (s/p )  · M25.562 left knee pain      Insurance/Certification information:     Physician Information:  Referring Practitioner: dr early   Plan of care signed (Y/N):     Date of Patient follow up with Physician:     G-Code (if applicable):      Date G-Code Applied:         Progress Note: []  Yes  [x]  No  Next due by: Visit #10       Latex Allergy:  [x]NO      []YES  Preferred Language for Healthcare:   [x]English       []other:    Visit # Insurance Allowable   26      Pain level: 0-6    SUBJECTIVE:  \"i am feeling pretty sore after all the walking I did this past weekend. Lora Nicolas \"       OBJECTIVE:    Observation:   + point tenderness @ pes anserine and itb insertion. .   Test measurements:      RESTRICTIONS/PRECAUTIONS:     Exercises/Interventions:     Therapeutic Ex Sets/sec Reps Notes   Bike     Flexionator/wall slide     Quad sets       5'     8'/     30x     4x     Bridges prone hamstring curls    single 30x; 30x mr     Sidelying clamshells   25x     slr's (flex, abd,add)   30x 6#     laq   30x 10#    saq   45x 10#    Heel prop/prone hang    8' heel prop     slt brd   4x     Leg press (B)   6\" ant step up; 4\" - 30x   Seated hamstring curls         Wall mini squats     Ext machine       SLS        Step overs     Side shuffle           Standing quad stretch   Manual Intervention      Prom/stm     28'                                                                                                   Therapeutic Exercise and NMR EXR  [x] (10931) Provided verbal/tactile cueing for activities related to strengthening, flexibility, endurance, ROM for improvements in LE, proximal hip, and core control with self care, mobility, lifting, ambulation.  [] (56826) Provided verbal/tactile cueing for activities related to improving balance, coordination, kinesthetic sense, posture, motor skill, proprioception  to assist with LE, proximal hip, and core control in self care, mobility, lifting, ambulation and eccentric single leg control. NMR and Therapeutic Activities:    [x] (82034 or 82962) Provided verbal/tactile cueing for activities related to improving balance, coordination, kinesthetic sense, posture, motor skill, proprioception and motor activation to allow for proper function of core, proximal hip and LE with self care and ADLs  [] (25780) Gait Re-education- Provided training and instruction to the patient for proper LE, core and proximal hip recruitment and positioning and eccentric body weight control with ambulation re-education including up and down stairs     Home Exercise Program:    [x] (34184) Reviewed/Progressed HEP activities related to strengthening, flexibility, endurance, ROM of core, proximal hip and LE for functional self-care, mobility, lifting and ambulation/stair navigation   [] (18175)Reviewed/Progressed HEP activities related to improving balance, coordination, kinesthetic sense, posture, motor skill, proprioception of core, proximal hip and LE for self care, mobility, lifting, and ambulation/stair navigation      Manual Treatments:  PROM / STM / Oscillations-Mobs:  G-I, II, III, IV (PA's, Inf., Post.)  [x] (96814) Provided manual therapy to mobilize LE, proximal hip and/or LS spine soft tissue/joints for the purpose of modulating pain, promoting relaxation,  increasing ROM, reducing/eliminating soft tissue swelling/inflammation/restriction, improving soft tissue extensibility and allowing for proper ROM for normal function with self care, mobility, lifting and ambulation.      Modalities:    Vaso   Charges:  Timed Code Treatment Minutes: 37'    Total Treatment Minutes: 90'      [] EVAL  [x] IS(23582) x  1   [] IONTO  [] NMR (94703) x      [] VASO  [x] Manual (45652) x  2    [] Other:  [] TA x       [] Mech Traction (85206)  [] ES(attended) (99083)      [] ES (un) (24710):     GOALS:   Long Term Goals: To be achieved in: 12 weeks reset on 5/12  1. Disability index score of  10% or less for the LEFS to assist with reaching prior level of function. 2. Patient will demonstrate increased AROM to 140 degrees to allow for proper joint functioning as indicated by patients Functional Deficits. 3. Patient will demonstrate an increase in Strength to 4+/5 knee to allow for proper functional mobility as indicated by patients Functional Deficits. 4. Patient will return to  functional activities without increased symptoms or restriction. 5: no gait deviations with walking or stair ambulation    Progression Towards Functional goals:  [x] Patient is progressing as expected towards functional goals listed. [] Progression is slowed due to complexities listed. [] Progression has been slowed due to co-morbidities.   [] Plan just implemented, too soon to assess goals progression  [] Other:     ASSESSMENT:      Treatment/Activity Tolerance:  [x] Patient tolerated treatment well [x] Patient limited by fatique  [x] Patient limited by pain  [] Patient limited by other medical complications  [] Other:     Prognosis: [] Good [] Fair  [] Poor    Patient Requires Follow-up: [x] Yes  [] No    PLAN:   [x] Continue per plan of care [] Alter current plan (see comments)  [] Plan of care initiated [] Hold pending MD visit [] Discharge    Electronically signed by: Jeri Garcia PT, MPT

## 2018-10-11 ENCOUNTER — HOSPITAL ENCOUNTER (OUTPATIENT)
Dept: PHYSICAL THERAPY | Age: 53
Setting detail: THERAPIES SERIES
Discharge: HOME | End: 2018-10-11
Payer: COMMERCIAL

## 2018-10-11 PROCEDURE — 97112 NEUROMUSCULAR REEDUCATION: CPT | Performed by: PHYSICAL THERAPIST

## 2018-10-11 PROCEDURE — 97110 THERAPEUTIC EXERCISES: CPT | Performed by: PHYSICAL THERAPIST

## 2018-10-11 PROCEDURE — 97140 MANUAL THERAPY 1/> REGIONS: CPT | Performed by: PHYSICAL THERAPIST

## 2018-10-18 ENCOUNTER — HOSPITAL ENCOUNTER (OUTPATIENT)
Dept: PHYSICAL THERAPY | Age: 53
Setting detail: THERAPIES SERIES
Discharge: HOME | End: 2018-10-18
Payer: COMMERCIAL

## 2018-10-18 PROCEDURE — 97140 MANUAL THERAPY 1/> REGIONS: CPT | Performed by: PHYSICAL THERAPIST

## 2018-10-18 PROCEDURE — 97110 THERAPEUTIC EXERCISES: CPT | Performed by: PHYSICAL THERAPIST

## 2018-10-21 NOTE — FLOWSHEET NOTE
related to strengthening, flexibility, endurance, ROM for improvements in LE, proximal hip, and core control with self care, mobility, lifting, ambulation.  [] (04409) Provided verbal/tactile cueing for activities related to improving balance, coordination, kinesthetic sense, posture, motor skill, proprioception  to assist with LE, proximal hip, and core control in self care, mobility, lifting, ambulation and eccentric single leg control. NMR and Therapeutic Activities:    [x] (02555 or 73681) Provided verbal/tactile cueing for activities related to improving balance, coordination, kinesthetic sense, posture, motor skill, proprioception and motor activation to allow for proper function of core, proximal hip and LE with self care and ADLs  [] (45546) Gait Re-education- Provided training and instruction to the patient for proper LE, core and proximal hip recruitment and positioning and eccentric body weight control with ambulation re-education including up and down stairs     Home Exercise Program:    [x] (56375) Reviewed/Progressed HEP activities related to strengthening, flexibility, endurance, ROM of core, proximal hip and LE for functional self-care, mobility, lifting and ambulation/stair navigation   [] (24792)Reviewed/Progressed HEP activities related to improving balance, coordination, kinesthetic sense, posture, motor skill, proprioception of core, proximal hip and LE for self care, mobility, lifting, and ambulation/stair navigation      Manual Treatments:  PROM / STM / Oscillations-Mobs:  G-I, II, III, IV (PA's, Inf., Post.)  [x] (25531) Provided manual therapy to mobilize LE, proximal hip and/or LS spine soft tissue/joints for the purpose of modulating pain, promoting relaxation,  increasing ROM, reducing/eliminating soft tissue swelling/inflammation/restriction, improving soft tissue extensibility and allowing for proper ROM for normal function with self care, mobility, lifting and ambulation.

## 2018-10-30 ENCOUNTER — HOSPITAL ENCOUNTER (OUTPATIENT)
Dept: PHYSICAL THERAPY | Age: 53
Setting detail: THERAPIES SERIES
Discharge: HOME | End: 2018-10-30
Payer: COMMERCIAL

## 2018-10-30 PROCEDURE — 97112 NEUROMUSCULAR REEDUCATION: CPT | Performed by: PHYSICAL THERAPIST

## 2018-10-30 PROCEDURE — 97140 MANUAL THERAPY 1/> REGIONS: CPT | Performed by: PHYSICAL THERAPIST

## 2018-10-30 PROCEDURE — 97110 THERAPEUTIC EXERCISES: CPT | Performed by: PHYSICAL THERAPIST

## 2018-11-14 ENCOUNTER — HOSPITAL ENCOUNTER (OUTPATIENT)
Dept: PHYSICAL THERAPY | Age: 53
Setting detail: THERAPIES SERIES
Discharge: HOME | End: 2018-11-14
Payer: COMMERCIAL

## 2018-11-14 PROCEDURE — 97016 VASOPNEUMATIC DEVICE THERAPY: CPT | Performed by: PHYSICAL THERAPIST

## 2018-11-14 PROCEDURE — 97110 THERAPEUTIC EXERCISES: CPT | Performed by: PHYSICAL THERAPIST

## 2018-11-15 NOTE — FLOWSHEET NOTE
to assess goals progression  [] Other:     ASSESSMENT:      Treatment/Activity Tolerance:  [x] Patient tolerated treatment well [x] Patient limited by fatique  [x] Patient limited by pain  [] Patient limited by other medical complications  [] Other:     Prognosis: [] Good [] Fair  [] Poor    Patient Requires Follow-up: [x] Yes  [] No    PLAN:   [x] Continue per plan of care [] Alter current plan (see comments)  [] Plan of care initiated [] Hold pending MD visit [] Discharge    Electronically signed by: Lissa Russell PT, MPT

## 2018-11-26 ENCOUNTER — OFFICE VISIT (OUTPATIENT)
Dept: ORTHOPEDIC SURGERY | Age: 53
End: 2018-11-26
Payer: COMMERCIAL

## 2018-11-26 VITALS
WEIGHT: 240.96 LBS | HEIGHT: 66 IN | DIASTOLIC BLOOD PRESSURE: 71 MMHG | HEART RATE: 64 BPM | BODY MASS INDEX: 38.73 KG/M2 | SYSTOLIC BLOOD PRESSURE: 109 MMHG

## 2018-11-26 DIAGNOSIS — S83.232D COMPLEX TEAR OF MEDIAL MENISCUS OF LEFT KNEE AS CURRENT INJURY, SUBSEQUENT ENCOUNTER: ICD-10-CM

## 2018-11-26 DIAGNOSIS — M25.562 LEFT KNEE PAIN, UNSPECIFIED CHRONICITY: Primary | ICD-10-CM

## 2018-11-26 PROCEDURE — 99213 OFFICE O/P EST LOW 20 MIN: CPT | Performed by: ORTHOPAEDIC SURGERY

## 2018-11-26 NOTE — LETTER
"[employer] [place] [address]  Phone: [phone]  Fax: [phone]    Mikael Bond MD        November 26, 2018     Patient: Ignacio Ingram   YOB: 1965   Date of Visit: 11/26/2018       To Whom It May Concern: It is my medical opinion that Ignacio Ingram requires a disability parking placard for the following reasons:  She cannot walk 200 feet without stopping to rest.  Duration of need: 6 months    If you have any questions or concerns, please don't hesitate to call.     Sincerely,          Mikael Bond MD

## 2018-11-26 NOTE — PROGRESS NOTES
Problem Relation Age of Onset    High Blood Pressure Mother     High Blood Pressure Father         Review of Systems  Pertinent items are noted in HPI  Review of systems reviewed from Patient History Form dated on 8/9/17 and available in the patient's chart under the Media tab. Vital Signs  Vitals:    11/26/18 1049   BP: 109/71   Pulse: 64       General Exam:   Constitutional: Patient is adequately groomed with no evidence of malnutrition  Mental Status: The patient is oriented to time, place and person. The patient's mood and affect are appropriate. Lymphatic: The lymphatic examination bilaterally reveals all areas to be without enlargement or induration. Neurological: The patient has good coordination. There is no weakness or sensory deficit. Gait: Normal    Left knee  Examination  Inspection:  Incisions well healed, no effusion    Palpation:  Tenderness over medial joint line, no lateral tenderness today    Range of Motion:  0-135 with pain with hyperflexion    Sensation: In tact to light touch all nerve distributions     Strength:  Knee flexion and extension motor intact with good quad tone    Special Tests:  Stable varus and valgus exam    Skin: There are no additional worrisome rashes, ulcerations or lesions. Circulation normal    Additional Examinations:  Right Lower Extremity: Examination of the right lower extremity does not show any tenderness, deformity or injury. Range of motion is unremarkable. There is no gross instability. There are no rashes, ulcerations or lesions. Strength and tone are normal.      Radiology:     X-rays obtained and reviewed in office:  AP, PA 45° weightbearing, sunrise, lateral 4 views left knee obtained 11/26/18 demonstrate no acute fracture. No dislocation. Tricompartmental degenerative changes with medial osteophyte formation and joint space narrowing with greater than 50% joint space remaining. No visible loose body.   Hardware intact medial

## 2018-11-29 ENCOUNTER — HOSPITAL ENCOUNTER (OUTPATIENT)
Dept: PHYSICAL THERAPY | Age: 53
Setting detail: THERAPIES SERIES
Discharge: HOME | End: 2018-11-29
Payer: COMMERCIAL

## 2018-11-29 PROCEDURE — 97110 THERAPEUTIC EXERCISES: CPT | Performed by: PHYSICAL THERAPIST

## 2018-11-29 PROCEDURE — 97140 MANUAL THERAPY 1/> REGIONS: CPT | Performed by: PHYSICAL THERAPIST

## 2018-12-02 NOTE — FLOWSHEET NOTE
(15685)Reviewed/Progressed HEP activities related to improving balance, coordination, kinesthetic sense, posture, motor skill, proprioception of core, proximal hip and LE for self care, mobility, lifting, and ambulation/stair navigation      Manual Treatments:  PROM / STM / Oscillations-Mobs:  G-I, II, III, IV (PA's, Inf., Post.)  [x] (75498) Provided manual therapy to mobilize LE, proximal hip and/or LS spine soft tissue/joints for the purpose of modulating pain, promoting relaxation,  increasing ROM, reducing/eliminating soft tissue swelling/inflammation/restriction, improving soft tissue extensibility and allowing for proper ROM for normal function with self care, mobility, lifting and ambulation. Modalities:    Cp   Charges:  Timed Code Treatment Minutes: 52'   Total Treatment Minutes: 90'      [] EVAL  [x] WN(36440) x  2   [] IONTO  [] NMR (17055) x      [] VASO  [x] Manual (24771) x       [] Other:  [] TA x       [] Mech Traction (46250)  [] ES(attended) (63201)      [] ES (un) (55513):     GOALS:   Long Term Goals: To be achieved in: 12 weeks reset on 5/12  1. Disability index score of  10% or less for the LEFS to assist with reaching prior level of function. 2. Patient will demonstrate increased AROM to 140 degrees to allow for proper joint functioning as indicated by patients Functional Deficits. 3. Patient will demonstrate an increase in Strength to 4+/5 knee to allow for proper functional mobility as indicated by patients Functional Deficits. 4. Patient will return to  functional activities without increased symptoms or restriction. 5: no gait deviations with walking or stair ambulation    Progression Towards Functional goals:  [x] Patient is progressing as expected towards functional goals listed. [] Progression is slowed due to complexities listed. [] Progression has been slowed due to co-morbidities.   [] Plan just implemented, too soon to assess goals progression  [] Other:

## 2018-12-05 ENCOUNTER — TELEPHONE (OUTPATIENT)
Dept: ORTHOPEDIC SURGERY | Age: 53
End: 2018-12-05

## 2018-12-13 ENCOUNTER — HOSPITAL ENCOUNTER (OUTPATIENT)
Dept: PHYSICAL THERAPY | Age: 53
Setting detail: THERAPIES SERIES
Discharge: HOME | End: 2018-12-13
Payer: COMMERCIAL

## 2018-12-13 PROCEDURE — 97140 MANUAL THERAPY 1/> REGIONS: CPT | Performed by: PHYSICAL THERAPIST

## 2018-12-13 PROCEDURE — 97110 THERAPEUTIC EXERCISES: CPT | Performed by: PHYSICAL THERAPIST

## 2018-12-14 ENCOUNTER — TELEPHONE (OUTPATIENT)
Dept: ORTHOPEDIC SURGERY | Age: 53
End: 2018-12-14

## 2018-12-14 ENCOUNTER — OFFICE VISIT (OUTPATIENT)
Dept: ORTHOPEDIC SURGERY | Age: 53
End: 2018-12-14
Payer: COMMERCIAL

## 2018-12-14 VITALS — WEIGHT: 240.96 LBS | HEIGHT: 66 IN | BODY MASS INDEX: 38.73 KG/M2

## 2018-12-14 DIAGNOSIS — M17.12 PRIMARY OSTEOARTHRITIS OF LEFT KNEE: Primary | ICD-10-CM

## 2018-12-14 DIAGNOSIS — S83.232D COMPLEX TEAR OF MEDIAL MENISCUS OF LEFT KNEE AS CURRENT INJURY, SUBSEQUENT ENCOUNTER: ICD-10-CM

## 2018-12-14 PROCEDURE — 99213 OFFICE O/P EST LOW 20 MIN: CPT | Performed by: ORTHOPAEDIC SURGERY

## 2018-12-17 ENCOUNTER — OFFICE VISIT (OUTPATIENT)
Dept: ORTHOPEDIC SURGERY | Age: 53
End: 2018-12-17
Payer: COMMERCIAL

## 2018-12-17 VITALS — WEIGHT: 240.96 LBS | HEIGHT: 66 IN | BODY MASS INDEX: 38.73 KG/M2

## 2018-12-17 DIAGNOSIS — M17.12 PRIMARY OSTEOARTHRITIS OF LEFT KNEE: Primary | ICD-10-CM

## 2018-12-17 DIAGNOSIS — S83.232D COMPLEX TEAR OF MEDIAL MENISCUS OF LEFT KNEE AS CURRENT INJURY, SUBSEQUENT ENCOUNTER: ICD-10-CM

## 2018-12-17 PROCEDURE — 20610 DRAIN/INJ JOINT/BURSA W/O US: CPT | Performed by: ORTHOPAEDIC SURGERY

## 2018-12-17 NOTE — PROGRESS NOTES
12/17/18 10:07 AM     Site & comments:   LEFT KNEE    Lot number: [id number]    Product:  [id number] NDC# for euflexxa

## 2018-12-24 ENCOUNTER — OFFICE VISIT (OUTPATIENT)
Dept: ORTHOPEDIC SURGERY | Age: 53
End: 2018-12-24
Payer: COMMERCIAL

## 2018-12-24 VITALS — HEIGHT: 66 IN | WEIGHT: 240.96 LBS | BODY MASS INDEX: 38.73 KG/M2

## 2018-12-24 DIAGNOSIS — M17.12 PRIMARY OSTEOARTHRITIS OF LEFT KNEE: Primary | ICD-10-CM

## 2018-12-24 PROCEDURE — 20610 DRAIN/INJ JOINT/BURSA W/O US: CPT | Performed by: ORTHOPAEDIC SURGERY

## 2018-12-24 NOTE — PROGRESS NOTES
Left knee Euflexxa injection    The patient returns today for their second Euflexxa injection to the left knee(s) for diagnosis of osteoarthritis. . The risks, benefits, and complications of the injections were again discussed in detail with the patient. The risks discussed included but are not limited to infection, skin reactions, hot swollen joints, and anaphylaxis. The patient gave verbal informed consent for the injection. The patient skin was prepped with iodine the left knee joint(s) was injected with 2 ml of Euflexxa intra-articularly under sterile conditions.      The patient tolerated the injection reasonably well. The patient was given instructions to ice the left knee and avoid strenuous activities for 24-48 hours. The patient was instructed to call the office immediately if there is increased pain, redness, warmth, fever, or chills. We will see the patient back in 1 week.     Jerry Anderson MD  [address] [employer] partner of [facility] ([facility])

## 2018-12-24 NOTE — PROGRESS NOTES
12/24/18 10:07 AM     Site & comments:   Left knee    Lot number: [id number]    Product:  [id number] NDC# for Sumaya Curtis

## 2018-12-28 ENCOUNTER — HOSPITAL ENCOUNTER (OUTPATIENT)
Dept: PHYSICAL THERAPY | Age: 53
Setting detail: THERAPIES SERIES
Discharge: HOME | End: 2018-12-28
Payer: COMMERCIAL

## 2018-12-28 PROCEDURE — 97110 THERAPEUTIC EXERCISES: CPT | Performed by: PHYSICAL THERAPIST

## 2018-12-28 PROCEDURE — 97140 MANUAL THERAPY 1/> REGIONS: CPT | Performed by: PHYSICAL THERAPIST

## 2018-12-31 ENCOUNTER — OFFICE VISIT (OUTPATIENT)
Dept: ORTHOPEDIC SURGERY | Age: 53
End: 2018-12-31
Payer: COMMERCIAL

## 2018-12-31 VITALS — WEIGHT: 240.96 LBS | HEIGHT: 66 IN | BODY MASS INDEX: 38.73 KG/M2

## 2018-12-31 DIAGNOSIS — M17.12 PRIMARY OSTEOARTHRITIS OF LEFT KNEE: Primary | ICD-10-CM

## 2018-12-31 PROCEDURE — 20610 DRAIN/INJ JOINT/BURSA W/O US: CPT | Performed by: ORTHOPAEDIC SURGERY

## 2019-01-01 NOTE — FLOWSHEET NOTE
proximal hip and LE for self care, mobility, lifting, and ambulation/stair navigation      Manual Treatments:  PROM / STM / Oscillations-Mobs:  G-I, II, III, IV (PA's, Inf., Post.)  [x] (71136) Provided manual therapy to mobilize LE, proximal hip and/or LS spine soft tissue/joints for the purpose of modulating pain, promoting relaxation,  increasing ROM, reducing/eliminating soft tissue swelling/inflammation/restriction, improving soft tissue extensibility and allowing for proper ROM for normal function with self care, mobility, lifting and ambulation. Modalities:    Cp   Charges:  Timed Code Treatment Minutes: 40'   Total Treatment Minutes: 62'      [] EVAL  [x] XH(22350) x  1   [] IONTO  [] NMR (41573) x      [] VASO  [x] Manual (48920) x  1    [] Other:  [] TA x       [] Mech Traction (17273)  [] ES(attended) (61521)      [] ES (un) (53420):     GOALS:   Long Term Goals: To be achieved in: 12 weeks reset on 5/12  1. Disability index score of  10% or less for the LEFS to assist with reaching prior level of function. 2. Patient will demonstrate increased AROM to 140 degrees to allow for proper joint functioning as indicated by patients Functional Deficits. 3. Patient will demonstrate an increase in Strength to 4+/5 knee to allow for proper functional mobility as indicated by patients Functional Deficits. 4. Patient will return to  functional activities without increased symptoms or restriction. 5: no gait deviations with walking or stair ambulation    Progression Towards Functional goals:  [x] Patient is progressing as expected towards functional goals listed. [] Progression is slowed due to complexities listed. [] Progression has been slowed due to co-morbidities.   [] Plan just implemented, too soon to assess goals progression  [] Other:     ASSESSMENT:      Treatment/Activity Tolerance:  [x] Patient tolerated treatment well [x] Patient limited by fatique  [x] Patient limited by pain  [] Patient

## 2019-05-29 RX ORDER — MELOXICAM 15 MG/1
TABLET ORAL
Qty: 30 TABLET | Refills: 1 | Status: SHIPPED | OUTPATIENT
Start: 2019-05-29

## 2019-11-19 ENCOUNTER — OFFICE VISIT (OUTPATIENT)
Dept: ORTHOPEDIC SURGERY | Age: 54
End: 2019-11-19
Payer: COMMERCIAL

## 2019-11-19 VITALS — HEIGHT: 66 IN | BODY MASS INDEX: 38.73 KG/M2 | WEIGHT: 240.96 LBS

## 2019-11-19 DIAGNOSIS — M17.12 PRIMARY OSTEOARTHRITIS OF LEFT KNEE: Primary | ICD-10-CM

## 2019-11-19 DIAGNOSIS — M22.8X2 MALTRACKING OF LEFT PATELLA: ICD-10-CM

## 2019-11-19 PROCEDURE — L1812 KO ELASTIC W/JOINTS PRE OTS: HCPCS | Performed by: ORTHOPAEDIC SURGERY

## 2019-11-19 PROCEDURE — 99213 OFFICE O/P EST LOW 20 MIN: CPT | Performed by: ORTHOPAEDIC SURGERY

## 2019-11-20 ENCOUNTER — TELEPHONE (OUTPATIENT)
Dept: ORTHOPEDIC SURGERY | Age: 54
End: 2019-11-20

## 2019-12-02 ENCOUNTER — OFFICE VISIT (OUTPATIENT)
Dept: ORTHOPEDIC SURGERY | Age: 54
End: 2019-12-02
Payer: COMMERCIAL

## 2019-12-02 VITALS — HEIGHT: 66 IN | WEIGHT: 240.96 LBS | BODY MASS INDEX: 38.73 KG/M2

## 2019-12-02 DIAGNOSIS — M17.12 PRIMARY OSTEOARTHRITIS OF LEFT KNEE: Primary | ICD-10-CM

## 2019-12-02 PROCEDURE — 20610 DRAIN/INJ JOINT/BURSA W/O US: CPT | Performed by: ORTHOPAEDIC SURGERY

## 2019-12-02 RX ORDER — HYALURONATE SODIUM 10 MG/ML
20 SYRINGE (ML) INTRAARTICULAR ONCE
Status: COMPLETED | OUTPATIENT
Start: 2019-12-02 | End: 2019-12-02

## 2019-12-02 RX ADMIN — Medication 20 MG: at 09:55

## 2019-12-16 ENCOUNTER — OFFICE VISIT (OUTPATIENT)
Dept: ORTHOPEDIC SURGERY | Age: 54
End: 2019-12-16
Payer: COMMERCIAL

## 2019-12-16 VITALS — HEIGHT: 66 IN | BODY MASS INDEX: 38.73 KG/M2 | WEIGHT: 240.96 LBS

## 2019-12-16 DIAGNOSIS — M17.12 PRIMARY OSTEOARTHRITIS OF LEFT KNEE: Primary | ICD-10-CM

## 2019-12-16 PROCEDURE — 20610 DRAIN/INJ JOINT/BURSA W/O US: CPT | Performed by: ORTHOPAEDIC SURGERY

## 2019-12-16 RX ORDER — HYALURONATE SODIUM 10 MG/ML
20 SYRINGE (ML) INTRAARTICULAR ONCE
Status: COMPLETED | OUTPATIENT
Start: 2019-12-16 | End: 2019-12-16

## 2019-12-16 RX ADMIN — Medication 20 MG: at 10:10

## 2019-12-23 ENCOUNTER — OFFICE VISIT (OUTPATIENT)
Dept: ORTHOPEDIC SURGERY | Age: 54
End: 2019-12-23
Payer: COMMERCIAL

## 2019-12-23 VITALS — BODY MASS INDEX: 38.73 KG/M2 | HEIGHT: 66 IN | WEIGHT: 240.96 LBS

## 2019-12-23 DIAGNOSIS — M17.12 PRIMARY OSTEOARTHRITIS OF LEFT KNEE: Primary | ICD-10-CM

## 2019-12-23 PROCEDURE — 20610 DRAIN/INJ JOINT/BURSA W/O US: CPT | Performed by: ORTHOPAEDIC SURGERY

## 2019-12-23 RX ORDER — HYALURONATE SODIUM 10 MG/ML
20 SYRINGE (ML) INTRAARTICULAR ONCE
Status: COMPLETED | OUTPATIENT
Start: 2019-12-23 | End: 2019-12-23

## 2019-12-23 RX ADMIN — Medication 20 MG: at 10:26

## 2020-10-07 NOTE — FLOWSHEET NOTE
Step overs     Side shuffle   30x 40# - 30x; 20# - 30x     20 x 5\"      30#; ecc20# - 30x; 10# - 20x        4 x 30\" foam        20x 6\"      10' x 2 laps     Manual Intervention      Prom    13'                                                                                                   Therapeutic Exercise and NMR EXR  [x] (51753) Provided verbal/tactile cueing for activities related to strengthening, flexibility, endurance, ROM for improvements in LE, proximal hip, and core control with self care, mobility, lifting, ambulation.  [] (46890) Provided verbal/tactile cueing for activities related to improving balance, coordination, kinesthetic sense, posture, motor skill, proprioception  to assist with LE, proximal hip, and core control in self care, mobility, lifting, ambulation and eccentric single leg control.      NMR and Therapeutic Activities:    [x] (65765 or 12340) Provided verbal/tactile cueing for activities related to improving balance, coordination, kinesthetic sense, posture, motor skill, proprioception and motor activation to allow for proper function of core, proximal hip and LE with self care and ADLs  [] (14638) Gait Re-education- Provided training and instruction to the patient for proper LE, core and proximal hip recruitment and positioning and eccentric body weight control with ambulation re-education including up and down stairs     Home Exercise Program:    [x] (32942) Reviewed/Progressed HEP activities related to strengthening, flexibility, endurance, ROM of core, proximal hip and LE for functional self-care, mobility, lifting and ambulation/stair navigation   [] (33336)Reviewed/Progressed HEP activities related to improving balance, coordination, kinesthetic sense, posture, motor skill, proprioception of core, proximal hip and LE for self care, mobility, lifting, and ambulation/stair navigation      Manual Treatments:  PROM / STM / Oscillations-Mobs:  G-I, II, III, IV (PA's, Inf., (3) walks occasionally

## 2021-03-23 ENCOUNTER — IMMUNIZATION (OUTPATIENT)
Dept: PRIMARY CARE CLINIC | Age: 56
End: 2021-03-23
Payer: COMMERCIAL

## 2021-03-23 PROCEDURE — 91300 COVID-19, PFIZER VACCINE 30MCG/0.3ML DOSE: CPT | Performed by: FAMILY MEDICINE

## 2021-03-23 PROCEDURE — 0001A COVID-19, PFIZER VACCINE 30MCG/0.3ML DOSE: CPT | Performed by: FAMILY MEDICINE

## 2021-04-13 ENCOUNTER — IMMUNIZATION (OUTPATIENT)
Dept: PRIMARY CARE CLINIC | Age: 56
End: 2021-04-13
Payer: COMMERCIAL

## 2021-04-13 PROCEDURE — 91300 COVID-19, PFIZER VACCINE 30MCG/0.3ML DOSE: CPT | Performed by: FAMILY MEDICINE

## 2021-04-13 PROCEDURE — 0002A COVID-19, PFIZER VACCINE 30MCG/0.3ML DOSE: CPT | Performed by: FAMILY MEDICINE

## 2022-03-21 NOTE — FLOWSHEET NOTE
Present, accurate, and signed exercises, including strengthening, or she would continue to have issues. Also gave her the [facility] group fitness schedule  Observation:   Test measurements:      RESTRICTIONS/PRECAUTIONS: none noted    Exercises/Interventions: see AT sheet    Therapeutic Ex Sets/reps Notes   Supine knee to opp shoulder stretch 3x30 sec bilat hep   hep   3 d gastroc stretches on incline 30 sec each way bilat hep   hooklying hip abd with band with ab brace Green versaloop 3 sec 3x10 Hep- 11/20- blue band for home      hep   SAQ with add squeeze 3# 3 sec 2x10 bilat                                           Manual Intervention     Prone low back general mobilizations Grade 2-3 x 5 min    STM proximal ham, ITB, gluteals 3 min Tender to prox ham, gluteals and piriformis   Passive stretches to bilat ham/ right piriformis and ITB/ lower back rotational stretch 8 min    STM to left plantar fascia 5 min              NMR re-education                                       Therapeutic Exercise and NMR EXR  [x] (68950) Provided verbal/tactile cueing for activities related to strengthening, flexibility, endurance, ROM for improvements in LE, proximal hip, and core control with self care, mobility, lifting, ambulation.  [] (87422) Provided verbal/tactile cueing for activities related to improving balance, coordination, kinesthetic sense, posture, motor skill, proprioception  to assist with LE, proximal hip, and core control in self care, mobility, lifting, ambulation and eccentric single leg control.      NMR and Therapeutic Activities:    [] (48897 or 82302) Provided verbal/tactile cueing for activities related to improving balance, coordination, kinesthetic sense, posture, motor skill, proprioception and motor activation to allow for proper function of core, proximal hip and LE with self care and ADLs  [] (81173) Gait Re-education- Provided training and instruction to the patient for proper LE, core and proximal hip recruitment and indicated by Functional Deficits. Long Term Goals: To be achieved in: 6 weeks  1. Disability index score of 28% or less for the LEFS to assist with reaching prior level of function. 2. Patient will demonstrate an increase in Strength to good proximal hip strength and control, within 5lb HHD in LE to allow for proper functional mobility as indicated by patients Functional Deficits. 3. Patient will return to adl functional activities without increased symptoms or restriction. 4. Decrease left foot and right hip/buttock pain from 7-8/10 to 3-4/10       Progression Towards Functional goals:  [] Patient is progressing as expected towards functional goals listed. [x] Progression is slowed due to complexities listed. [] Progression has been slowed due to co-morbidities. [] Plan just implemented, too soon to assess goals progression  [] Other:     ASSESSMENT:  See eval    Treatment/Activity Tolerance:  [x] Patient tolerated treatment well [] Patient limited by fatique  [] Patient limited by pain  [] Patient limited by other medical complications  [x] Other: progress toward goals is slow due to multiple issues with bilat lower extremities and nature of her job (on her feet for many hours)    Prognosis: [] Good [] Fair  [] Poor    Patient Requires Follow-up: [x] Yes  [] No    PLAN: Cont PT 1x/week for 2 more weeks.   Patient will schedule follow up with Dr. Madhav Andujar  [x] Continue per plan of care [] Alter current plan (see comments)  [] Plan of care initiated [] Hold pending MD visit [] Discharge    Electronically signed by: Radha Lee PT